# Patient Record
Sex: FEMALE | Race: WHITE
[De-identification: names, ages, dates, MRNs, and addresses within clinical notes are randomized per-mention and may not be internally consistent; named-entity substitution may affect disease eponyms.]

---

## 2017-01-04 ENCOUNTER — HOSPITAL ENCOUNTER (OUTPATIENT)
Dept: HOSPITAL 47 - RADXRMAIN | Age: 57
Discharge: HOME | End: 2017-01-04
Payer: COMMERCIAL

## 2017-01-04 DIAGNOSIS — J20.9: Primary | ICD-10-CM

## 2017-01-04 PROCEDURE — 71020: CPT

## 2017-01-04 NOTE — XR
EXAMINATION TYPE: XR chest 2V

 

DATE OF EXAM: 1/4/2017 1:55 PM

 

COMPARISON: 2/16/2016

 

HISTORY: Bronchitis and cough

 

FINDINGS:

The lungs are clear and  there is no pneumothorax, pleural effusion, or focal pneumonia. Postsurgical
 change overlying the cervical spine. Scoliosis noted. Hyperinflation suggestive of COPD. Changes sug
gestive of chronic interstitial lung disease. Degenerative change of the spine. 

 

IMPRESSION: 

1. No acute process.

## 2017-01-12 ENCOUNTER — HOSPITAL ENCOUNTER (OUTPATIENT)
Dept: HOSPITAL 47 - RADMRIMAIN | Age: 57
Discharge: HOME | End: 2017-01-12
Payer: COMMERCIAL

## 2017-01-12 DIAGNOSIS — M50.21: ICD-10-CM

## 2017-01-12 DIAGNOSIS — M47.816: ICD-10-CM

## 2017-01-12 DIAGNOSIS — M48.02: Primary | ICD-10-CM

## 2017-01-12 LAB
BUN SERPL-SCNC: 16 MG/DL (ref 7–17)
NON-AFRICAN AMERICAN GFR(MDRD): >60

## 2017-01-12 PROCEDURE — 84520 ASSAY OF UREA NITROGEN: CPT

## 2017-01-12 PROCEDURE — 82565 ASSAY OF CREATININE: CPT

## 2017-01-12 PROCEDURE — 72148 MRI LUMBAR SPINE W/O DYE: CPT

## 2017-01-12 PROCEDURE — 72156 MRI NECK SPINE W/O & W/DYE: CPT

## 2017-01-12 NOTE — MR
EXAMINATION TYPE: MR cervical spine wo/w con

 

DATE OF EXAM: 1/12/2017 12:07 PM

 

COMPARISON: NONE

 

HISTORY: Cervicalgia

 

TECHNIQUE: 

Multiplanar, multisequence images of the cervical spine were acquired utilizing 10 mL intravenous Mul
tiHance gadolinium contrast.  Diffusion weighted imaging was performed.   

 

The vertebra have normal alignment. There is anterior fusion with metal artifact at C5 C6 C7. The cer
vical spinal cord has normal signal pattern without evidence of edema. Brainstem is intact. There is 
no spinal stenosis. I see no sign of a posterior cervical significant disc herniation. There is a sma
ll posterior disc bulge at C3-4 towards the left side. The contrast images show no pathologic enhance
ment. There is disc space narrowing at C5-6 C6-7.

 

IMPRESSION:

Previous anterior fusion surgery. Degenerative disc narrowing at C5-6 C6-7. No spinal stenosis. No ev
idence of any cord impingement. There is a small left sided and central C3-4 disc bulging without imp
ingement on the spinal canal to any extent.

## 2017-01-12 NOTE — MR
EXAMINATION TYPE: MR lumbar spine wo con

 

DATE OF EXAM: 1/12/2017 11:43 AM

 

COMPARISON: MRI lumbar spine January 18, 2013. CT abdomen and pelvis May 20, 2016.

 

HISTORY: Lumbago per order. Severe low back pain with difficulty walking with pain into both lower ex
tremities as well as numbness and tingling per patient.

 

TECHNIQUE: Multiplanar, multisequence imaging of the lumbar spine is performed without IV contrast.

 

FINDINGS: Sagittal images of the lumbar spine show vertebral body heights to appear satisfactory. The
re is persistent dextroconvex scoliosis centered at L2-L3 level. There is multilevel disc desiccation
 redemonstrated. There is fairly moderate disc space narrowing with vacuum disc phenomenon most promi
nent at left L2-L3 level where there is moderate spurring and endplate changes redemonstrated. Multil
evel small posterior disc herniations are seen on sagittal images. The conus medullaris is normal in 
position and signal ending at mid L1 vertebral body level.  The bone marrow signal intensity is other
wise within normal limits. There is mild multilevel anterior spurring redemonstrated.

 

Axial images beginning at the T12-L1 level which shows mild broad disc bulge mildly effacing anterior
 thecal sac on axial image 29. Bilateral neural foramina are felt patent. No significant change from 
prior study is seen.

 

Axial images at the L1-L2 level show mild facet degenerative changes bilaterally. There is mild broad
 disc bulge mildly effacing anterior thecal sac on axial image 25, bilateral neural foramina are lancaster
nt.

 

Axial images at L2-L3 level show mild/moderate broad disc bulge with left foraminal/lateral disc prot
rusion component on axial image 20. There is effacement of the anterior thecal sac. Mild facet degene
rative changes are redemonstrated bilaterally. Right-sided neural foramina is patent. There is modera
te to severe left-sided neural foraminal narrowing with effacement of left L2 nerve redemonstrated on
 sagittal image 5. Finding appears similar to prior exam.

 

Axial images at L3-L4 level show mild to moderate facet degenerative changes and ligamentum flavum hy
pertrophy with some effacement of the posterior lateral thecal sac. There is mild broad-based posteri
or disc protrusion mildly effacing anterior thecal sac. There is mild to moderate bilateral anterior 
inferior neural foraminal narrowing at this level now identified. Some progression in degenerative fi
nding is felt present versus prior MRI at this level.

 

Axial images at L4-L5 level show mild to moderate facet degenerative changes and ligamentum flavum hy
pertrophy with some effacement the posterior lateral thecal sac on axial image 10. There is mild/mode
rate broad-based posterior disc protrusion effacing anterior thecal sac. There is moderate to severe 
right-sided neural foraminal narrowing and more mild left-sided neural foraminal narrowing identified
. There is felt interval increased in right-sided neural foraminal narrowing seen best on sagittal im
age 11 versus prior study. Some spurring is noted at this foraminal level.

 

Axial images at L5-S1 level show mild facet degenerative changes bilaterally. There is central disc p
rotrusion is seen. Spinal canal is fairly well preserved. Bilateral neural foramina are patent.

 

Common bile duct remains prominent measuring up to 11 mm diameter on axial image 21, this is grossly 
stable from prior exams. Patient is noted status post cholecystectomy but this is slightly more promi
nent than expected after cholecystectomy.

 

 

IMPRESSION: Underlying scoliosis with multilevel degenerative changes in lumbar spine as detailed abo
ve. Progression in degenerative findings as 2013 MRI is noted as detailed above.

## 2018-02-14 ENCOUNTER — HOSPITAL ENCOUNTER (INPATIENT)
Dept: HOSPITAL 47 - EC | Age: 58
LOS: 3 days | Discharge: HOME | DRG: 193 | End: 2018-02-17
Attending: HOSPITALIST | Admitting: HOSPITALIST
Payer: COMMERCIAL

## 2018-02-14 DIAGNOSIS — M79.7: ICD-10-CM

## 2018-02-14 DIAGNOSIS — J44.1: ICD-10-CM

## 2018-02-14 DIAGNOSIS — I11.9: ICD-10-CM

## 2018-02-14 DIAGNOSIS — Z79.891: ICD-10-CM

## 2018-02-14 DIAGNOSIS — Z82.49: ICD-10-CM

## 2018-02-14 DIAGNOSIS — Z79.899: ICD-10-CM

## 2018-02-14 DIAGNOSIS — J84.89: ICD-10-CM

## 2018-02-14 DIAGNOSIS — K21.9: ICD-10-CM

## 2018-02-14 DIAGNOSIS — J96.21: ICD-10-CM

## 2018-02-14 DIAGNOSIS — I25.10: ICD-10-CM

## 2018-02-14 DIAGNOSIS — E86.0: ICD-10-CM

## 2018-02-14 DIAGNOSIS — Z79.52: ICD-10-CM

## 2018-02-14 DIAGNOSIS — G89.29: ICD-10-CM

## 2018-02-14 DIAGNOSIS — I73.9: ICD-10-CM

## 2018-02-14 DIAGNOSIS — Z86.718: ICD-10-CM

## 2018-02-14 DIAGNOSIS — Z90.710: ICD-10-CM

## 2018-02-14 DIAGNOSIS — Z80.1: ICD-10-CM

## 2018-02-14 DIAGNOSIS — J44.0: ICD-10-CM

## 2018-02-14 DIAGNOSIS — Z95.1: ICD-10-CM

## 2018-02-14 DIAGNOSIS — Z79.02: ICD-10-CM

## 2018-02-14 DIAGNOSIS — M19.90: ICD-10-CM

## 2018-02-14 DIAGNOSIS — Z98.1: ICD-10-CM

## 2018-02-14 DIAGNOSIS — Z90.49: ICD-10-CM

## 2018-02-14 DIAGNOSIS — Z85.828: ICD-10-CM

## 2018-02-14 DIAGNOSIS — J10.08: Primary | ICD-10-CM

## 2018-02-14 DIAGNOSIS — F17.200: ICD-10-CM

## 2018-02-14 DIAGNOSIS — Z91.041: ICD-10-CM

## 2018-02-14 DIAGNOSIS — Z88.6: ICD-10-CM

## 2018-02-14 DIAGNOSIS — Z88.0: ICD-10-CM

## 2018-02-14 DIAGNOSIS — S22.32XA: ICD-10-CM

## 2018-02-14 DIAGNOSIS — J15.9: ICD-10-CM

## 2018-02-14 DIAGNOSIS — Z79.51: ICD-10-CM

## 2018-02-14 DIAGNOSIS — Z88.1: ICD-10-CM

## 2018-02-14 DIAGNOSIS — E78.5: ICD-10-CM

## 2018-02-14 DIAGNOSIS — J12.89: ICD-10-CM

## 2018-02-14 DIAGNOSIS — Z82.3: ICD-10-CM

## 2018-02-14 LAB
ALBUMIN SERPL-MCNC: 3.7 G/DL (ref 3.5–5)
ALP SERPL-CCNC: 89 U/L (ref 38–126)
ALT SERPL-CCNC: 18 U/L (ref 9–52)
ANION GAP SERPL CALC-SCNC: 10 MMOL/L
APTT BLD: 23.9 SEC (ref 22–30)
AST SERPL-CCNC: 18 U/L (ref 14–36)
BASOPHILS # BLD AUTO: 0 K/UL (ref 0–0.2)
BASOPHILS NFR BLD AUTO: 1 %
BUN SERPL-SCNC: 21 MG/DL (ref 7–17)
CALCIUM SPEC-MCNC: 8.8 MG/DL (ref 8.4–10.2)
CHLORIDE SERPL-SCNC: 98 MMOL/L (ref 98–107)
CK SERPL-CCNC: 67 U/L (ref 30–135)
CO2 SERPL-SCNC: 29 MMOL/L (ref 22–30)
EOSINOPHIL # BLD AUTO: 0 K/UL (ref 0–0.7)
EOSINOPHIL NFR BLD AUTO: 0 %
ERYTHROCYTE [DISTWIDTH] IN BLOOD BY AUTOMATED COUNT: 4.07 M/UL (ref 3.8–5.4)
ERYTHROCYTE [DISTWIDTH] IN BLOOD: 13.4 % (ref 11.5–15.5)
GLUCOSE SERPL-MCNC: 131 MG/DL (ref 74–99)
HCT VFR BLD AUTO: 38.4 % (ref 34–46)
HGB BLD-MCNC: 12.2 GM/DL (ref 11.4–16)
HYALINE CASTS UR QL AUTO: 1 /LPF (ref 0–2)
INR PPP: 0.9 (ref ?–1.2)
LYMPHOCYTES # SPEC AUTO: 1 K/UL (ref 1–4.8)
LYMPHOCYTES NFR SPEC AUTO: 14 %
MCH RBC QN AUTO: 29.9 PG (ref 25–35)
MCHC RBC AUTO-ENTMCNC: 31.7 G/DL (ref 31–37)
MCV RBC AUTO: 94.3 FL (ref 80–100)
MONOCYTES # BLD AUTO: 0.6 K/UL (ref 0–1)
MONOCYTES NFR BLD AUTO: 8 %
NEUTROPHILS # BLD AUTO: 5.5 K/UL (ref 1.3–7.7)
NEUTROPHILS NFR BLD AUTO: 76 %
PH UR: 6 [PH] (ref 5–8)
PLATELET # BLD AUTO: 234 K/UL (ref 150–450)
POTASSIUM SERPL-SCNC: 3.6 MMOL/L (ref 3.5–5.1)
PROT SERPL-MCNC: 6.2 G/DL (ref 6.3–8.2)
PROT UR QL: (no result)
PT BLD: 9.3 SEC (ref 9–12)
RBC UR QL: 1 /HPF (ref 0–5)
SODIUM SERPL-SCNC: 137 MMOL/L (ref 137–145)
SP GR UR: 1.01 (ref 1–1.03)
SQUAMOUS UR QL AUTO: 1 /HPF (ref 0–4)
TROPONIN I SERPL-MCNC: <0.012 NG/ML (ref 0–0.03)
UROBILINOGEN UR QL STRIP: <2 MG/DL (ref ?–2)
WBC # BLD AUTO: 7.3 K/UL (ref 3.8–10.6)
WBC #/AREA URNS HPF: 2 /HPF (ref 0–5)

## 2018-02-14 PROCEDURE — 87086 URINE CULTURE/COLONY COUNT: CPT

## 2018-02-14 PROCEDURE — 70450 CT HEAD/BRAIN W/O DYE: CPT

## 2018-02-14 PROCEDURE — 81001 URINALYSIS AUTO W/SCOPE: CPT

## 2018-02-14 PROCEDURE — 87502 INFLUENZA DNA AMP PROBE: CPT

## 2018-02-14 PROCEDURE — 93005 ELECTROCARDIOGRAM TRACING: CPT

## 2018-02-14 PROCEDURE — 95819 EEG AWAKE AND ASLEEP: CPT

## 2018-02-14 PROCEDURE — 71046 X-RAY EXAM CHEST 2 VIEWS: CPT

## 2018-02-14 PROCEDURE — 80053 COMPREHEN METABOLIC PANEL: CPT

## 2018-02-14 PROCEDURE — 82553 CREATINE MB FRACTION: CPT

## 2018-02-14 PROCEDURE — 94640 AIRWAY INHALATION TREATMENT: CPT

## 2018-02-14 PROCEDURE — 36415 COLL VENOUS BLD VENIPUNCTURE: CPT

## 2018-02-14 PROCEDURE — 83605 ASSAY OF LACTIC ACID: CPT

## 2018-02-14 PROCEDURE — 84484 ASSAY OF TROPONIN QUANT: CPT

## 2018-02-14 PROCEDURE — 85610 PROTHROMBIN TIME: CPT

## 2018-02-14 PROCEDURE — 96361 HYDRATE IV INFUSION ADD-ON: CPT

## 2018-02-14 PROCEDURE — 96365 THER/PROPH/DIAG IV INF INIT: CPT

## 2018-02-14 PROCEDURE — 82550 ASSAY OF CK (CPK): CPT

## 2018-02-14 PROCEDURE — 80048 BASIC METABOLIC PNL TOTAL CA: CPT

## 2018-02-14 PROCEDURE — 99285 EMERGENCY DEPT VISIT HI MDM: CPT

## 2018-02-14 PROCEDURE — 87040 BLOOD CULTURE FOR BACTERIA: CPT

## 2018-02-14 PROCEDURE — 85730 THROMBOPLASTIN TIME PARTIAL: CPT

## 2018-02-14 PROCEDURE — 72072 X-RAY EXAM THORAC SPINE 3VWS: CPT

## 2018-02-14 PROCEDURE — 85025 COMPLETE CBC W/AUTO DIFF WBC: CPT

## 2018-02-14 RX ADMIN — METHYLPREDNISOLONE SODIUM SUCCINATE SCH MG: 125 INJECTION, POWDER, FOR SOLUTION INTRAMUSCULAR; INTRAVENOUS at 21:21

## 2018-02-14 RX ADMIN — CARVEDILOL SCH MG: 12.5 TABLET, FILM COATED ORAL at 21:21

## 2018-02-14 RX ADMIN — BUDESONIDE SCH MG: 1 SUSPENSION RESPIRATORY (INHALATION) at 21:29

## 2018-02-14 RX ADMIN — NICARDIPINE HYDROCHLORIDE SCH MLS/HR: 2.5 INJECTION INTRAVENOUS at 14:00

## 2018-02-14 RX ADMIN — PANTOPRAZOLE SODIUM SCH MG: 40 TABLET, DELAYED RELEASE ORAL at 21:22

## 2018-02-14 RX ADMIN — IPRATROPIUM BROMIDE SCH: 0.5 SOLUTION RESPIRATORY (INHALATION) at 21:30

## 2018-02-14 RX ADMIN — NICARDIPINE HYDROCHLORIDE SCH MLS/HR: 2.5 INJECTION INTRAVENOUS at 14:35

## 2018-02-14 RX ADMIN — OXYCODONE AND ACETAMINOPHEN PRN EACH: 5; 325 TABLET ORAL at 21:38

## 2018-02-14 NOTE — ED
Syncope HPI





- General


Chief Complaint: Syncope


Stated Complaint: Altered Mental Status


Time Seen by Provider: 18 13:26


Source: patient


Mode of arrival: wheelchair


Limitations: no limitations





- History of Present Illness


Initial Comments: 


58 years old female was found on the kitchen floor by her  445 this 

morning she was quite altered mental status she was confused she Repeating 

things and family wanted to bring her to the hospital and she refused to come 

to the hospital at this state continued for about 5-6 hours when finally she 

agreed to come to the hospital she is complaining about shortness of breath and 

then she spiked a fever and her fever was 102.3 in the ER, complaining about 

chest pain complaining about shortness of breath and she has been coughing.  

Positive changes in mental status positive chest pain past a shortness of 

breath no abdominal pain no frequency urgency dysuria.  She has a multiple 

comorbidities at a peripheral vascular disease with multiple interventions








- Related Data


 Home Medications











 Medication  Instructions  Recorded  Confirmed


 


Carvedilol 25 mg PO BID 12/11/15 02/14/18


 


Cetirizine HCl [Zyrtec] 10 mg PO DAILY 12/11/15 02/14/18


 


Hydrochlorothiazide 25 mg PO DAILY 12/11/15 02/14/18


 


Losartan Potassium 100 mg PO DAILY 12/11/15 02/14/18


 


Morphine Sulfate [Morphine Sulfate 30 mg PO DAILY PRN 12/11/15 02/14/18





ER]   


 


Omeprazole [PriLOSEC] 20 mg PO BID 12/11/15 02/14/18


 


Pravastatin Sodium [Pravachol] 20 mg PO DAILY 12/11/15 02/14/18


 


Ipratropium Bromide [Atrovent Hfa] 2 puff INHALATION RT-QID 16


 


Azithromycin [Zithromax Z-pack] See Taper PO AS DIRECTED 18


 


Baclofen [Lioresal] 10 mg PO TID PRN 18


 


Depo-Medrol Injection 80 mg SQ ONCE 18


 


Fluticasone Propionate [Flovent 1 puff INHALATION RT-BID 18





Hfa 110mcg]   


 


Ipratropium-Albuterol Nebulize 3 ml INHALATION RT-QID PRN 18





[Duoneb 0.5 mg-3 mg/3 ml Soln]   


 


oxyCODONE-APAP 5-325MG [Percocet 1 tab PO DAILY PRN 18





5-325 mg]   


 


predniSONE 20 mg PO DAILY 18








 Previous Rx's











 Medication  Instructions  Recorded


 


Clopidogrel [Plavix] 75 mg PO DAILY #30 tab 12/17/15











 Allergies











Allergy/AdvReac Type Severity Reaction Status Date / Time


 


Iodinated Contrast- Oral and Allergy Mild cold Verified 18 13:38





IV Dye   symptoms,  





[Iodinated Contrast Media -   runny  





IV Dye]   nose,  





   itchy eyes  





   cough  


 


aspirin Allergy  Anaphylaxis Verified 18 13:38


 


cephalexin monohydrate AdvReac  Anaphylaxis Verified 18 13:38





[From Keflex]     


 


NSAIDS (Non-Steroidal AdvReac  Anaphylaxis Verified 18 13:38





Anti-Inflamma     


 


Penicillins AdvReac  Anaphylaxis Verified 18 13:38


 


bandaid AdvReac Mild skin peels Uncoded 18 13:22





   off  














Review of Systems


ROS Statement: 


Those systems with pertinent positive or pertinent negative responses have been 

documented in the HPI.





ROS Other: All systems not noted in ROS Statement are negative.





Past Medical History


Past Medical History: Asthma, Coronary Artery Disease (CAD), Cancer, COPD, Deep 

Vein Thrombosis (DVT), Fibromyalgia, GERD/Reflux, Hyperlipidemia, Hypertension, 

Osteoarthritis (OA), Respiratory Disorder


Additional Past Medical History / Comment(s): chronic bronchitis, nodules on 

thyroid, lip and skin cancer. Pt currently has gangrene on right foot


History of Any Multi-Drug Resistant Organisms: None Reported


Past Surgical History: Appendectomy,  Section, Cholecystectomy, 

Coronary Bypass/CABG, Hysterectomy, Joint Replacement, Orthopedic Surgery


Additional Past Surgical History / Comment(s): cervical fusion,aortogram w/ 

runoff rt arm, 12-16-15 ILIAC STENTING.


Past Anesthesia/Blood Transfusion Reactions: Previous Problems w/ Anesthesia, 

Postoperative Nausea & Vomiting (PONV)


Additional Past Anesthesia/Blood Transfusion Reaction / Comment(s): difficulty 

come out of anesthesia


Past Psychological History: Depression


Smoking Status: Current every day smoker


Past Alcohol Use History: None Reported


Past Drug Use History: None Reported





- Past Family History


  ** Mother


Family Medical History: Cancer, Coronary Artery Disease (CAD), Renal Disease


Additional Family Medical History / Comment(s): CABG, LUNG CANCER,





  ** Brother(s)


Family Medical History: Coronary Artery Disease (CAD), CVA/TIA


Additional Family Medical History / Comment(s): 1 BROTHER CABG, 1 BROTHER STROKE





  ** Father


Family Medical History: Coronary Artery Disease (CAD), Renal Disease


Additional Family Medical History / Comment(s): X4 OPEN HEART SX. HUGE CARDIAC 

HX ON DADS SIDE OF FAMILY





General Exam





- General Exam Comments


Initial Comments: 


General:  The patient is awake and alert, she looks pale and weak 


Skin:  Skin is warm and dry and no rashes or lesions are noted. 


Eye:  Pupils are equal, round and reactive to light, extra-ocular movements are 

intact; there is normal conjunctiva bilaterally.  


Ears, nose, mouth and throat:  Membranes are dry, she looks dehydrated


Neck:  The neck is supple, there is no tenderness  or JVD.  


Cardiovascular:  There is a regular rate and rhythm. No murmur, rub or gallop 

is appreciated.


Respiratory: To auscultation bilateral, severe COPD


Gastrointestinal:  Soft, non-distended, non-tender abdomen without masses or 

organomegaly noted. There is no rebound or guarding present. Bowel sounds are 

unremarkable. 


Back:  There is no tenderness to palpation in the midline. There is no obvious 

deformity.


Musculoskeletal:  Normal ROM, no tenderness, There is no pedal edema. There is 

no calf tenderness or swelling. No cords were appreciated.  


Neurological:  CN II-XII intact, Cranial nerves III through XII are intact. 

There are no obvious motor or sensory deficits. Coordination appears grossly 

intact. Speech is normal.


Psychiatric:  Cooperative, appropriate mood & affect, normal judgment.  








Limitations: no limitations





Course


 Vital Signs











  18





  13:17 13:55 14:12


 


Temperature 98.6 F 102.8 F H 


 


Pulse Rate 104 H  95


 


Respiratory 20  18





Rate   


 


Blood Pressure 120/59  


 


O2 Sat by Pulse 87 L  





Oximetry   














  18





  14:28


 


Temperature 


 


Pulse Rate 99


 


Respiratory 





Rate 


 


Blood Pressure 


 


O2 Sat by Pulse 





Oximetry 








ALLERGIES reassessed at 1515, CBC is unremarkable INR is within normal range 

compressive metabolic panel is unremarkable flu a is positive him and she 

earlier had a fever of 102 with the septic workup week get her started on some 

broad-spectrum antibiotics blood cultures urine cultures were ordered along 

with chest x-ray and chest x-ray shows an interstitial pneumonitis and COPD, 

head CT is pending still.  Considering M she had altered mental status for 

several hours here in the CT is normal I would like to put in the hospital 

continue the Tamiflu as well as antibiotic to the cultures come back wound will 

keep her under Dr. Roe's service





EKG Findings





- EKG Comments:


EKG Findings:: Normal sinus rhythm ventricular rate is 98 TN interval is 126 

QRS duration is 82 QT/QTC 3: review of this EKG does not reveal any ST 

elevation or ST depression





Medical Decision Making





- Lab Data


Result diagrams: 


 18 14:00





 18 14:00


 Lab Results











  18 Range/Units





  14:00 14:00 14:00 


 


WBC   7.3   (3.8-10.6)  k/uL


 


RBC   4.07   (3.80-5.40)  m/uL


 


Hgb   12.2   (11.4-16.0)  gm/dL


 


Hct   38.4   (34.0-46.0)  %


 


MCV   94.3   (80.0-100.0)  fL


 


MCH   29.9   (25.0-35.0)  pg


 


MCHC   31.7   (31.0-37.0)  g/dL


 


RDW   13.4   (11.5-15.5)  %


 


Plt Count   234   (150-450)  k/uL


 


Neutrophils %   76   %


 


Lymphocytes %   14   %


 


Monocytes %   8   %


 


Eosinophils %   0   %


 


Basophils %   1   %


 


Neutrophils #   5.5   (1.3-7.7)  k/uL


 


Lymphocytes #   1.0   (1.0-4.8)  k/uL


 


Monocytes #   0.6   (0-1.0)  k/uL


 


Eosinophils #   0.0   (0-0.7)  k/uL


 


Basophils #   0.0   (0-0.2)  k/uL


 


PT     (9.0-12.0)  sec


 


INR     (<1.2)  


 


APTT     (22.0-30.0)  sec


 


Sodium    137  (137-145)  mmol/L


 


Potassium    3.6  (3.5-5.1)  mmol/L


 


Chloride    98  ()  mmol/L


 


Carbon Dioxide    29  (22-30)  mmol/L


 


Anion Gap    10  mmol/L


 


BUN    21 H  (7-17)  mg/dL


 


Creatinine    0.70  (0.52-1.04)  mg/dL


 


Est GFR (MDRD) Af Amer    >60  (>60 ml/min/1.73 sqM)  


 


Est GFR (MDRD) Non-Af    >60  (>60 ml/min/1.73 sqM)  


 


Glucose    131 H  (74-99)  mg/dL


 


Plasma Lactic Acid Waldemar     (0.7-2.0)  mmol/L


 


Calcium    8.8  (8.4-10.2)  mg/dL


 


Total Bilirubin    0.2  (0.2-1.3)  mg/dL


 


AST    18  (14-36)  U/L


 


ALT    18  (9-52)  U/L


 


Alkaline Phosphatase    89  ()  U/L


 


Total Creatine Kinase  67    ()  U/L


 


CK-MB (CK-2)  1.2    (0.0-2.4)  ng/mL


 


CK-MB (CK-2) Rel Index  1.8    


 


Troponin I  <0.012    (0.000-0.034)  ng/mL


 


Total Protein    6.2 L  (6.3-8.2)  g/dL


 


Albumin    3.7  (3.5-5.0)  g/dL


 


Influenza Type A RNA     (Not Detectd)  


 


Influenza Type B (PCR)     (Not Detectd)  














  18 Range/Units





  14:00 14:00 14:10 


 


WBC     (3.8-10.6)  k/uL


 


RBC     (3.80-5.40)  m/uL


 


Hgb     (11.4-16.0)  gm/dL


 


Hct     (34.0-46.0)  %


 


MCV     (80.0-100.0)  fL


 


MCH     (25.0-35.0)  pg


 


MCHC     (31.0-37.0)  g/dL


 


RDW     (11.5-15.5)  %


 


Plt Count     (150-450)  k/uL


 


Neutrophils %     %


 


Lymphocytes %     %


 


Monocytes %     %


 


Eosinophils %     %


 


Basophils %     %


 


Neutrophils #     (1.3-7.7)  k/uL


 


Lymphocytes #     (1.0-4.8)  k/uL


 


Monocytes #     (0-1.0)  k/uL


 


Eosinophils #     (0-0.7)  k/uL


 


Basophils #     (0-0.2)  k/uL


 


PT  9.3    (9.0-12.0)  sec


 


INR  0.9    (<1.2)  


 


APTT  23.9    (22.0-30.0)  sec


 


Sodium     (137-145)  mmol/L


 


Potassium     (3.5-5.1)  mmol/L


 


Chloride     ()  mmol/L


 


Carbon Dioxide     (22-30)  mmol/L


 


Anion Gap     mmol/L


 


BUN     (7-17)  mg/dL


 


Creatinine     (0.52-1.04)  mg/dL


 


Est GFR (MDRD) Af Amer     (>60 ml/min/1.73 sqM)  


 


Est GFR (MDRD) Non-Af     (>60 ml/min/1.73 sqM)  


 


Glucose     (74-99)  mg/dL


 


Plasma Lactic Acid Waldemar   1.1   (0.7-2.0)  mmol/L


 


Calcium     (8.4-10.2)  mg/dL


 


Total Bilirubin     (0.2-1.3)  mg/dL


 


AST     (14-36)  U/L


 


ALT     (9-52)  U/L


 


Alkaline Phosphatase     ()  U/L


 


Total Creatine Kinase     ()  U/L


 


CK-MB (CK-2)     (0.0-2.4)  ng/mL


 


CK-MB (CK-2) Rel Index     


 


Troponin I     (0.000-0.034)  ng/mL


 


Total Protein     (6.3-8.2)  g/dL


 


Albumin     (3.5-5.0)  g/dL


 


Influenza Type A RNA    Detected H  (Not Detectd)  


 


Influenza Type B (PCR)    Not Detected  (Not Detectd)  














Disposition


Clinical Impression: 


 Syncope, Altered mental status, Sepsis, Influenza A





Disposition: ADMITTED AS IP TO THIS HOSP


Condition: Fair


Referrals: 


Parmjit Roe MD [Primary Care Provider] - 1-2 days

## 2018-02-14 NOTE — CT
EXAMINATION TYPE: CT brain wo con

 

DATE OF EXAM: 2/14/2018

 

COMPARISON: 4/17/2014

 

HISTORY: 58-year-old female complains of syncope.

 

TECHNIQUE:  Examination was done in axial plane without intravenous contrast.  Coronal and sagittal r
econstructions performed.

 

CT DLP: 816.3 mGycm

Automated exposure control for dose reduction was used.

 

FINDINGS:

There is no evidence of  acute intracranial hemorrhage, acute ischemic changes, mass, mass-effect, or
 extra-axial fluid collection.  There is no effacement of cerebral sulci or basal subarachnoid cister
ns.  There is no hydrocephalus.  There is no midline shift.  Gray-white matter distinction is preserv
ed.

 

Paranasal sinuses and mastoid air cells are well pneumatized. Orbits and globes are intact.

 

 

IMPRESSION:

No acute intracranial abnormality seen.

## 2018-02-14 NOTE — XR
EXAMINATION TYPE: XR chest 2V

 

DATE OF EXAM: 2/14/2018

 

COMPARISON: 1/4/2017

 

TECHNIQUE: PA and lateral views submitted.

 

HISTORY: Syncope

 

FINDINGS:

The lungs are clear and  there is no pneumothorax, pleural effusion, or focal pneumonia. There is hyp
erinflation suggests COPD and there is degenerative change of the spine. Heart size stable. Surgical 
change overlying the cervical spine. Somewhat coarsened central interstitium.

 

Arthropathy of the shoulders.

 

IMPRESSION: 

1. Interstitium is somewhat increased relative to the prior exam correlate for mild venous congestion
 or interstitial pneumonitis superimposed on a background of COPD.

## 2018-02-14 NOTE — P.HPIM
History of Present Illness


H&P Date: 18


Chief Complaint: Altered mental status





Patient is a 58-year-old female with a known history of COPD not on home oxygen

, fibromyalgia, chronic pain, and severe peripheral vascular disease with 

history of I aortic and femoral popliteal surgeries was found on the kitchen 

floor by her  445 this morning she was quite altered mental status she 

was confused she Repeating things and family wanted to bring her to the 

hospital and she refused to come to the hospital at this state continued for 

about 5-6 hours when finally she agreed to come to the hospital.  Patient has 

been cough and cold for the past 2-3 days. she is complaining about shortness 

of breath and then she spiked a fever and her fever was 102.3 in the ER, 

complaining about chest pain complaining about shortness of breath and she has 

been coughing. no abdominal pain no frequency urgency dysuria.


Patient says that she might have sleep apnea on the floor and has been having 

some left ankle pain and otherwise denied any head injury or any other pain.  

Denied any history of seizures.  No history of CVA in the past.  No bladder or 

bowel incontinence.


Chest x-ray showed COPD and possible interstitial pneumonitis


CT head showed no acute intracranial process





Review of Systems





Constitutional: Patient does have fever and chills and generalized weakness .  

No weight loss.  


Abdomen: Patient denied nausea vomiting and diarrhea and abdominal pain.


Cardiovascular: Patient denies any chest pain no palpitations.  Does have 

shortness of breath.  No leg swelling


Respiratory: Cough without sputum production shortness of breath


Neurologic: Patient denied any numbness or tingling headache.


Musculoskeletal: Patient denies any complaints of joint swelling or deformity.  

Left ankle pain


Skin: Negative


Psychiatric: Anxious


Endocrine: No heat or cold intolerance.  No recent weight gain.


Genitourinary: No dysuria or hematuria.


All other 14 point ROS negative except the above





Past Medical History


Past Medical History: Asthma, Coronary Artery Disease (CAD), Cancer, COPD, Deep 

Vein Thrombosis (DVT), Fibromyalgia, GERD/Reflux, Hyperlipidemia, Hypertension, 

Osteoarthritis (OA), Respiratory Disorder, Vascular Disorder


Additional Past Medical History / Comment(s): pad,chronic bronchitis, nodules 

on thyroid, leukoplakia-lip and vocal cords. past" gangrene on right foot"


History of Any Multi-Drug Resistant Organisms: None Reported


Past Surgical History: Appendectomy,  Section, Cholecystectomy, 

Hysterectomy, Joint Replacement, Orthopedic Surgery


Additional Past Surgical History / Comment(s): leukoplakia removed from lip and 

vocal cords.cervical fusion, rt aorto iliac bypass,aortogram w/ runoff.  2015 had stenting of aorto to rt ext iliac bypass, rt foot 2nd toe 

reconstructive sx, rt carotid endarterectomy, egd/colonoscopy ,cervical fusion,

lt knee replacment.lt elbow sx,umbilical hernia repair,bilcarpal tunnel release,

lt breast bx-neg


Past Anesthesia/Blood Transfusion Reactions: Previous Problems w/ Anesthesia, 

Postoperative Nausea & Vomiting (PONV)


Additional Past Anesthesia/Blood Transfusion Reaction / Comment(s): difficulty 

waking from anesthesia


Smoking Status: Current every day smoker





- Past Family History


  ** Mother


Family Medical History: Cancer, Coronary Artery Disease (CAD), Renal Disease


Additional Family Medical History / Comment(s): CABG, LUNG CANCER,





  ** Brother(s)


Family Medical History: Coronary Artery Disease (CAD), CVA/TIA


Additional Family Medical History / Comment(s): 1 BROTHER CABG, 1 BROTHER STROKE





  ** Father


Family Medical History: Coronary Artery Disease (CAD), Renal Disease


Additional Family Medical History / Comment(s): X4 OPEN HEART SX. HUGE CARDIAC 

HX ON DADS SIDE OF FAMILY





Medications and Allergies


 Home Medications











 Medication  Instructions  Recorded  Confirmed  Type


 


Carvedilol 25 mg PO BID 12/11/15 02/14/18 History


 


Cetirizine HCl [Zyrtec] 10 mg PO DAILY 12/11/15 02/14/18 History


 


Hydrochlorothiazide 25 mg PO DAILY 12/11/15 02/14/18 History


 


Losartan Potassium 100 mg PO DAILY 12/11/15 02/14/18 History


 


Morphine Sulfate [Morphine Sulfate 30 mg PO DAILY PRN 12/11/15 02/14/18 History





ER]    


 


Omeprazole [PriLOSEC] 20 mg PO BID 12/11/15 02/14/18 History


 


Pravastatin Sodium [Pravachol] 20 mg PO DAILY 12/11/15 02/14/18 History


 


Clopidogrel [Plavix] 75 mg PO DAILY #30 tab 12/17/15 02/14/18 Rx


 


Ipratropium Bromide [Atrovent Hfa] 2 puff INHALATION RT-QID 16 

History


 


Azithromycin [Zithromax Z-pack] See Taper PO AS DIRECTED 18 

History


 


Baclofen [Lioresal] 10 mg PO TID PRN 18 History


 


Depo-Medrol Injection 80 mg SQ ONCE 18 History


 


Fluticasone Propionate [Flovent 1 puff INHALATION RT-BID 18 

History





Hfa 110mcg]    


 


Ipratropium-Albuterol Nebulize 3 ml INHALATION RT-QID PRN 18 

History





[Duoneb 0.5 mg-3 mg/3 ml Soln]    


 


oxyCODONE-APAP 5-325MG [Percocet 1 tab PO DAILY PRN 18 History





5-325 mg]    


 


predniSONE 20 mg PO DAILY 18 History











 Allergies











Allergy/AdvReac Type Severity Reaction Status Date / Time


 


Iodinated Contrast- Oral and Allergy Mild cold Verified 18 13:38





IV Dye   symptoms,  





[Iodinated Contrast Media -   runny  





IV Dye]   nose,  





   itchy eyes  





   cough  


 


aspirin Allergy  Anaphylaxis Verified 18 13:38


 


cephalexin monohydrate AdvReac  Anaphylaxis Verified 18 13:38





[From Keflex]     


 


NSAIDS (Non-Steroidal AdvReac  Anaphylaxis Verified 18 13:38





Anti-Inflamma     


 


Penicillins AdvReac  Anaphylaxis Verified 18 13:38


 


bandaid AdvReac Mild skin peels Uncoded 18 13:22





   off  














Physical Exam


Vitals: 


 Vital Signs











  Temp Pulse Pulse Resp BP BP Pulse Ox


 


 18 18:55  100.4 F H   98  18   133/62  94 L


 


 18 18:00  101.8 F H  96   20  128/61   94 L


 


 18 16:00   110 H   20  126/92   95


 


 18 15:22  101.8 F H  101 H   20  131/88   95


 


 18 14:28   99     


 


 18 14:12   95   18   


 


 18 13:55  102.8 F H      


 


 18 13:17  98.6 F  104 H   20  120/59   87 L








 Intake and Output











 18





 06:59 14:59 22:59


 


Other:   


 


  Weight  49.895 kg 








 Patient Weight











 02/15/18





 06:59


 


Weight 49.895 kg














PHYSICAL EXAMINATION: 


Patient is lying in the bed comfortably, mild distress, awake alert and 

oriented.  Seems anxious. 


HEENT: Normocephalic. Neck is supple. Pupils reactive. Nostrils clear. Oral 

cavity is moist. Ears reveal no drainage. 


Neck reveals no JVD, carotid bruits, or thyromegaly. 


CHEST EXAMINATION: Trachea is central. Symmetrical expansion. Lung fields clear 

to auscultation and percussion. 


CARDIAC: Normal S1, S2 with no gallops. No murmurs 


ABDOMEN: Soft. Bowel sounds normal. No organomegaly. No abdominal bruits. 


Extremities: reveal no edema.  No clubbing or cyanosis


Neurologically awake, alert, oriented x3 with well-coordinated movements.  No 

focal deficits noted


Skin: No rash or skin lesions. 


Psychiatric: Cooperative.  Nonsuicidal


Musculoskeletal: No joint swelling or deformity.  Normal range of motion.








Results


CBC & Chem 7: 


 18 14:00





 18 14:00


Labs: 


 Abnormal Lab Results - Last 24 Hours (Table)











  18 Range/Units





  14:00 14:10 


 


BUN  21 H   (7-17)  mg/dL


 


Glucose  131 H   (74-99)  mg/dL


 


Total Protein  6.2 L   (6.3-8.2)  g/dL


 


Influenza Type A RNA   Detected H  (Not Detectd)  














Thrombosis Risk Factor Assmnt





- DVT/VTE Prophylaxis


DVT/VTE Prophylaxis: Pharmacologic Prophylaxis ordered





Assessment and Plan


Assessment: 





Altered mental status and found on floor.  Resolved now.  Unlikely 

encephalopathy.


Syncope likely due to dehydration and volume depletion and infection.  Ruled 

out acute CVA. CT head negative


Acute influenza A infection


History of severe peripheral vascular disease and multiple aortic and femoral 

surgeries


COPD with exacerbation


Fibromyalgia and chronic pain


Hyperlipidemia


Hypertension


Osteoarthritis and history of cervical fusion


History of right foot gangrene


History of right carotid endarterectomy


DVT prophylaxis





Plan:


Patient be continued on IV fluids.  Continue the Tamiflu.  Currently patient is 

awake and oriented.  CT head negative for any acute CVA.  Continue the home 

medications.  Patient also will be continued on breathing treatments and IV 

steroids and empiric antibiotics.  Follow up closely.  Neurology was consulted 

for evaluation of syncope.  Further recommendations based on the clinical 

course.


Time with Patient: Greater than 30

## 2018-02-15 LAB
ANION GAP SERPL CALC-SCNC: 10 MMOL/L
BASOPHILS # BLD AUTO: 0 K/UL (ref 0–0.2)
BASOPHILS NFR BLD AUTO: 0 %
BUN SERPL-SCNC: 14 MG/DL (ref 7–17)
CALCIUM SPEC-MCNC: 8.8 MG/DL (ref 8.4–10.2)
CHLORIDE SERPL-SCNC: 104 MMOL/L (ref 98–107)
CO2 SERPL-SCNC: 26 MMOL/L (ref 22–30)
EOSINOPHIL # BLD AUTO: 0 K/UL (ref 0–0.7)
EOSINOPHIL NFR BLD AUTO: 0 %
ERYTHROCYTE [DISTWIDTH] IN BLOOD BY AUTOMATED COUNT: 3.92 M/UL (ref 3.8–5.4)
ERYTHROCYTE [DISTWIDTH] IN BLOOD: 13.4 % (ref 11.5–15.5)
GLUCOSE SERPL-MCNC: 159 MG/DL (ref 74–99)
HCT VFR BLD AUTO: 36.3 % (ref 34–46)
HGB BLD-MCNC: 11.9 GM/DL (ref 11.4–16)
LYMPHOCYTES # SPEC AUTO: 0.6 K/UL (ref 1–4.8)
LYMPHOCYTES NFR SPEC AUTO: 14 %
MCH RBC QN AUTO: 30.3 PG (ref 25–35)
MCHC RBC AUTO-ENTMCNC: 32.6 G/DL (ref 31–37)
MCV RBC AUTO: 92.7 FL (ref 80–100)
MONOCYTES # BLD AUTO: 0.1 K/UL (ref 0–1)
MONOCYTES NFR BLD AUTO: 3 %
NEUTROPHILS # BLD AUTO: 3.1 K/UL (ref 1.3–7.7)
NEUTROPHILS NFR BLD AUTO: 81 %
PLATELET # BLD AUTO: 242 K/UL (ref 150–450)
POTASSIUM SERPL-SCNC: 4.4 MMOL/L (ref 3.5–5.1)
SODIUM SERPL-SCNC: 140 MMOL/L (ref 137–145)
WBC # BLD AUTO: 3.9 K/UL (ref 3.8–10.6)

## 2018-02-15 RX ADMIN — METHYLPREDNISOLONE SODIUM SUCCINATE SCH MG: 125 INJECTION, POWDER, FOR SOLUTION INTRAMUSCULAR; INTRAVENOUS at 06:36

## 2018-02-15 RX ADMIN — OXYCODONE AND ACETAMINOPHEN PRN EACH: 5; 325 TABLET ORAL at 16:56

## 2018-02-15 RX ADMIN — OSELTAMIVIR PHOSPHATE SCH MG: 75 CAPSULE ORAL at 21:19

## 2018-02-15 RX ADMIN — METHYLPREDNISOLONE SODIUM SUCCINATE SCH MG: 125 INJECTION, POWDER, FOR SOLUTION INTRAMUSCULAR; INTRAVENOUS at 18:42

## 2018-02-15 RX ADMIN — METHYLPREDNISOLONE SODIUM SUCCINATE SCH MG: 125 INJECTION, POWDER, FOR SOLUTION INTRAMUSCULAR; INTRAVENOUS at 01:24

## 2018-02-15 RX ADMIN — IPRATROPIUM BROMIDE SCH MG: 0.5 SOLUTION RESPIRATORY (INHALATION) at 08:38

## 2018-02-15 RX ADMIN — OSELTAMIVIR PHOSPHATE SCH MG: 75 CAPSULE ORAL at 07:49

## 2018-02-15 RX ADMIN — IPRATROPIUM BROMIDE SCH MG: 0.5 SOLUTION RESPIRATORY (INHALATION) at 16:17

## 2018-02-15 RX ADMIN — CLOPIDOGREL BISULFATE SCH MG: 75 TABLET ORAL at 08:59

## 2018-02-15 RX ADMIN — LORATADINE SCH MG: 10 TABLET ORAL at 08:59

## 2018-02-15 RX ADMIN — LOSARTAN POTASSIUM SCH MG: 50 TABLET, FILM COATED ORAL at 08:59

## 2018-02-15 RX ADMIN — NICOTINE SCH PATCH: 21 PATCH, EXTENDED RELEASE TRANSDERMAL at 09:00

## 2018-02-15 RX ADMIN — ENOXAPARIN SODIUM SCH MG: 40 INJECTION SUBCUTANEOUS at 08:59

## 2018-02-15 RX ADMIN — BUDESONIDE SCH MG: 1 SUSPENSION RESPIRATORY (INHALATION) at 20:26

## 2018-02-15 RX ADMIN — PRAVASTATIN SODIUM SCH MG: 20 TABLET ORAL at 09:00

## 2018-02-15 RX ADMIN — PANTOPRAZOLE SODIUM SCH MG: 40 TABLET, DELAYED RELEASE ORAL at 21:20

## 2018-02-15 RX ADMIN — METHYLPREDNISOLONE SODIUM SUCCINATE SCH MG: 125 INJECTION, POWDER, FOR SOLUTION INTRAMUSCULAR; INTRAVENOUS at 14:03

## 2018-02-15 RX ADMIN — MORPHINE SULFATE PRN MG: 30 TABLET, FILM COATED, EXTENDED RELEASE ORAL at 07:47

## 2018-02-15 RX ADMIN — LEVOFLOXACIN SCH MG: 500 TABLET, FILM COATED ORAL at 14:03

## 2018-02-15 RX ADMIN — PANTOPRAZOLE SODIUM SCH MG: 40 TABLET, DELAYED RELEASE ORAL at 07:48

## 2018-02-15 RX ADMIN — CARVEDILOL SCH MG: 12.5 TABLET, FILM COATED ORAL at 07:47

## 2018-02-15 RX ADMIN — CARVEDILOL SCH MG: 12.5 TABLET, FILM COATED ORAL at 16:56

## 2018-02-15 RX ADMIN — IPRATROPIUM BROMIDE SCH: 0.5 SOLUTION RESPIRATORY (INHALATION) at 12:26

## 2018-02-15 RX ADMIN — BUDESONIDE SCH MG: 1 SUSPENSION RESPIRATORY (INHALATION) at 08:38

## 2018-02-15 RX ADMIN — IPRATROPIUM BROMIDE SCH MG: 0.5 SOLUTION RESPIRATORY (INHALATION) at 20:26

## 2018-02-15 RX ADMIN — HYDROCHLOROTHIAZIDE SCH MG: 25 TABLET ORAL at 08:59

## 2018-02-15 NOTE — XR
Chest x-ray with left RIBS

 

HISTORY: Back pain, fall

 

Frontal view of the chest, 4 views of the left ribs submitted and correlated to prior chest x-ray 2/1
4/2018

 

Bone mineralization is reduced which may limit sensitivity. No definite displaced rib fracture is hamilton
dent, however, questionable nondisplaced fracture at the posterior sixth rib on the left seen on the 
oblique view. Chest shows interstitial changes. Postop changes noted to the cervical spine. The aorta
 is dense. Prominent lung volumes suggest underlying COPD. There is an underlying scoliosis. Postop c
hanges are noted to the abdomen, iliac stent placement has been performed. Heart size is normal. Pulm
onary artery appears prominently possibly due to pulmonary artery hypertension.

 

IMPRESSION: Possible posterior left sixth rib fracture, bone scan could be performed for additional e
valuation. Interstitial lung disease, possible underlying emphysema, pulmonary artery hypertension, a
dditional findings above.

## 2018-02-15 NOTE — XR
Thoracic spine

 

HISTORY: Trauma and pain

 

3 views of the thoracic spine

 

Correlation to chest x-ray and left rib same date

 

Postop change noted to the cervical spine. Bone mineralization is reduced which could limit sensitivi
ty. There is an S-shaped thoracic lumbar scoliosis with associated spondylosis. Disc spaces are maint
ained. Surgical clips present in the right upper quadrant. Thoracic vertebral body height is maintain
ed. Mild anterolisthesis grade 1 C3-4 incidentally noted may be due to facet arthropathy.

 

IMPRESSION: No acute fracture or subluxation. Scoliosis and osteopenia, bone scan or MRI may be of be
nefit for better evaluation. Additional findings above.

## 2018-02-15 NOTE — P.PN
Subjective


Progress Note Date: 02/15/18


Principal diagnosis: 





Syncopal episode





This is a 58-year-old  female known to our practice being evaluated 

for an episode of syncope.  She was brought to [facility] emergency 

room after she was found on the kitchen floor by her .  She remembers 

feeling fatigued and lightheaded that day.  She has a history of significant 

COPD.  She also reports having some upper respiratory symptoms.  She has no 

history of seizures.  No seizure-like activity was witnessed.  At presentation 

in the emergency room she did have a temperature of 102.8.  Her oxygen 

saturation was 87%.  Her computed tomography scan of the brain showed no acute 

intracranial abnormalities.  Chest x-ray showed possible pneumonitis.  She did 

test positive for influenza A.  She has been started on Tamiflu and IV 

hydration.  She was also started on Levaquin for possible pneumonia.  Because 

of her fall and x-ray of the thoracic spine and rib series was done.  Thoracic 

spine showed no acute abnormalities.  There was indication there may be a left 

sixth rib fracture.  She is sore in this area.  At the time my evaluation she 

is sitting up in resting comfortably in bed.  She is in no significant 

distress.  She has had no syncopal or presyncopal symptoms since her admission.

  An EEG has just been performed.





Objective





- Vital Signs


Vital signs: 


 Vital Signs











Temp  96.5 F L  02/15/18 07:00


 


Pulse  104 H  02/15/18 08:58


 


Resp  18   02/15/18 07:00


 


BP  127/57   02/15/18 07:00


 


Pulse Ox  94 L  02/15/18 07:00








 Intake & Output











 02/14/18 02/15/18 02/15/18





 18:59 06:59 18:59


 


Intake Total  650 


 


Output Total  1200 


 


Balance  -550 


 


Weight 49.895 kg  


 


Intake:   


 


  Oral  650 


 


Output:   


 


  Urine  1200 


 


Other:   


 


  Voiding Method  Toilet 


 


  # Voids  1 














- Constitutional


General appearance: Present: cooperative, mild distress, thin





- EENT


Eyes: Present: EOMI, PERRLA.  Absent: abnormal pupil, ptosis


ENT: Present: hearing grossly normal





- Neck


Neck: Present: normal ROM.  Absent: rigidity





- Respiratory


Respiratory: negative: wheezing, prolonged expiration, prolonged inspiration





- Cardiovascular


Rhythm: regular





- Gastrointestinal


General gastrointestinal: Absent: distended, tenderness





- Neurologic


Neurologic Comment(s): 





She is alert awake and oriented 3.  Speech-language are normal.  There is no 

facial asymmetry.  Strength is full in bilateral upper lower extremities.  

There is no sensory deficit.  No tremors or seizure-like activities are seen.





- Labs


CBC & Chem 7: 


 02/15/18 08:09





 02/15/18 08:09


Labs: 


 Abnormal Lab Results - Last 24 Hours (Table)











  02/14/18 02/15/18 02/15/18 Range/Units





  22:00 08:09 08:09 


 


Lymphocytes #   0.6 L   (1.0-4.8)  k/uL


 


Creatinine    0.45 L  (0.52-1.04)  mg/dL


 


Glucose    159 H  (74-99)  mg/dL


 


Urine Protein  1+ H    (Negative)  


 


Urine Mucus  Occasional H    (None)  /hpf








 Microbiology - Last 24 Hours (Table)











 02/14/18 22:00 Urine Culture - Preliminary





 Urine,Voided 














Assessment and Plan


(1) Febrile respiratory illness


Current Visit: Yes   Status: Acute   Code(s): J98.9 - RESPIRATORY DISORDER, 

UNSPECIFIED; R50.9 - FEVER, UNSPECIFIED   SNOMED Code(s): 59889692


   





(2) Rib pain


Current Visit: Yes   Status: Acute   Code(s): R07.81 - PLEURODYNIA   SNOMED Code

(s): 158968175


   





(3) Rib fracture


Current Visit: Yes   Status: Acute   Code(s): S22.39XA - FRACTURE OF ONE RIB, 

UNSP SIDE, INIT FOR CLOS FX   SNOMED Code(s): 10791776


   





(4) COPD (chronic obstructive pulmonary disease)


Current Visit: Yes   Status: Chronic   Code(s): J44.9 - CHRONIC OBSTRUCTIVE 

PULMONARY DISEASE, UNSPECIFIED   SNOMED Code(s): 55545306


   





(5) Tobacco dependence


Current Visit: Yes   Status: Chronic   Code(s): F17.200 - NICOTINE DEPENDENCE, 

UNSPECIFIED, UNCOMPLICATED   SNOMED Code(s): 39941846


   





(6) Influenza A


Current Visit: Yes   Status: Acute   Code(s): J10.1 - FLU DUE TO OTH IDENT 

INFLUENZA VIRUS W OTH RESP MANIFEST   SNOMED Code(s): 515531821


   





(7) Syncope


Current Visit: Yes   Status: Acute   Code(s): R55 - SYNCOPE AND COLLAPSE   

SNOMED Code(s): 374990762


   


Plan: 





The patient has had a syncopal episode.  This is likely due to her upper 

respiratory illnesses compounded by her chronic obstructive pulmonary disease.  

Recommend supportive care.  Recommend continue antibiotics Tamiflu and IV 

hydration.  Probably not much to be done about her rib fracture, but consider 

orthopedic referral if pain is severe.  Barring any of her seen abnormalities 

on her EEG she is cleared from a neurological standpoint, and we will follow 

her up in outpatient setting.





I have performed a history and physical on the above patient.  I have reviewed 

the above note, and agree.

## 2018-02-15 NOTE — CONS
CONSULTATION



DATE OF CONSULTATION:

2018



CHIEF COMPLAINT:

Syncope.



HISTORY OF PRESENT ILLNESS:

The patient is a pleasant 58-year-old,  female, who is being evaluated today

on 2018 by the Neurology Service per the request of Dr. España for a syncopal

spell.  The patient was brought into [facility] Emergency Room after she was

found on the kitchen floor unconscious by her .  The patient states that she was

quite fatigued that morning and remembers feeling lightheaded and short of breath.  She

does have history of chronic obstructive pulmonary disease.  She also reports having

some fevers although she never checked her temperature.  When her  found her, he

was able to wake her up but she was somewhat confused.  She did not have any sphincter

incontinence and no seizure-like activity was witnessed.  She did not have any tongue

biting.  She denies any previous history of seizures.  In the emergency room, she was

found to have a temperature of 102.8.  Her oxygen saturation was 87%.  A CT scan of the

brain was done which showed no intracranial abnormalities.  A chest x-ray was done,

which showed evidence of possible pneumonitis.  A flu test was done and she was

positive for influenza A virus and negative for influenza B.  She was started on

Tamiflu and IV hydration and admitted for further workup and management.  The patient

was also started on Levaquin for possible pneumonia. At the time of my evaluation, she

is sitting up in her bed and appears to be in no acute distress.  She denies any

recurrence of any syncopal or presyncopal symptoms.  She is complaining of some

posterior rib pain in the thoracic region along with some thoracic spine pain.  Her CBC

and cardiac enzymes were normal.  Her comprehensive metabolic profile showed slightly

elevated BUN at 21 and glucose at 131.



PAST MEDICAL HISTORY:

Chronic obstructive pulmonary disease, asthma, coronary artery disease, history of deep

venous thrombosis, fibromyalgia, gastroesophageal reflux disease, hypertension,

dyslipidemia, arthritis, history of thyroid nodule, skin cancer, history of

appendectomy, , cholecystectomy, coronary artery bypass grafting,

hysterectomy, joint replacement surgery, cervical spine fusion, iliac stenting,

depression.



SOCIAL HISTORY:

The patient is a current every day smoker.  She denies any alcohol or drug use.



FAMILY HISTORY:

Positive for cancer, stroke and heart disease.



HOME MEDICATIONS:

Reviewed in the chart.



ALLERGIES:

IV DYE, ASPIRIN, KEFLEX, NSAIDS, PENICILLIN, BAND-AIDS.



REVIEW OF SYSTEMS:

CONSTITUTIONAL:  As mentioned above.

EYES:  Negative.

ENT:  Negative.

CARDIOVASCULAR:  Negative.

RESPIRATORY:  As mentioned above.

NEUROLOGICAL:  As mentioned above.  She denies any lateralizing numbness or weakness.

GASTROINTESTINAL:  Positive for occasional heartburn.

GENITOURINARY:  Negative.

PSYCHIATRIC:  Positive for history of depression.

MUSCULOSKELETAL:  Positive for recurrent joint pain and spine pain.  Also, as mentioned

above.

ENDOCRINE:  Negative.

DERMATOLOGICAL:  Positive for history of skin cancer.



PHYSICAL EXAM:

Vital signs show a temperature of 100.4, pulse 98, respiration 18, blood pressure

133/62.

GENERAL APPEARANCE:  The patient is a thin, elderly,  female, who appears to

be in no acute distress.

HEENT:  Normocephalic, atraumatic, no facial asymmetry is seen.

NECK:  Supple with no masses felt.

CARDIOVASCULAR:  Regular rate and rhythm.

ABDOMEN:  Nontender, nondistended.

EXTREMITIES:  Showed no edema or clubbing.

NEUROLOGICAL EXAM:  The patient is alert, aware, and oriented x3.  Speech and language

are normal.  Strength is full in all 4 extremities.  Sensory exam was normal to light

touch in all 4 extremities.  No facial asymmetry seen on cranial nerve testing.  No

tremors or seizure-like activity is seen.



IMPRESSION:

1. Syncopal spell.

2. Febrile illness.

3. Influenza A virus infection.

4. Thoracic spine pain and rib pain.

5. Chronic obstructive pulmonary disease exacerbation.

6. Tobacco dependence.



RECOMMENDATION:

The patient did have a syncopal episode, but the exact etiology is unknown at this

time.  She does remember feeling fatigued and lightheaded prior to the episode.  This

could be related to her chronic obstructive pulmonary disease exacerbation, respiratory

tract infection, influenza virus infection, and dehydration.  Her CT scan of the brain

was reviewed and it showed no acute findings.  Continue antibiotics, Tamiflu, and IV

hydration.  Continue neuro checks.  An EEG has been ordered.  She is complaining of

some posterior left rib pain and thoracic spine pain.  I do recommend radiological

testing to rule out any acute fractures.  The patient was counseled extensively on

tobacco cessation.  I will continue to follow with you.  Further recommendations to

follow.



Thank you for allowing me to participate in the care of your patient.  If you have any

questions, please feel free to contact me.





[email or web address] / IJN: [id number] / Job#: [id number]

## 2018-02-16 VITALS — RESPIRATION RATE: 18 BRPM

## 2018-02-16 RX ADMIN — IPRATROPIUM BROMIDE SCH MG: 0.5 SOLUTION RESPIRATORY (INHALATION) at 08:13

## 2018-02-16 RX ADMIN — OSELTAMIVIR PHOSPHATE SCH MG: 75 CAPSULE ORAL at 21:02

## 2018-02-16 RX ADMIN — LORATADINE SCH MG: 10 TABLET ORAL at 07:38

## 2018-02-16 RX ADMIN — METHYLPREDNISOLONE SODIUM SUCCINATE SCH MG: 125 INJECTION, POWDER, FOR SOLUTION INTRAMUSCULAR; INTRAVENOUS at 11:34

## 2018-02-16 RX ADMIN — HYDROCHLOROTHIAZIDE SCH MG: 25 TABLET ORAL at 07:38

## 2018-02-16 RX ADMIN — LEVOFLOXACIN SCH MG: 500 TABLET, FILM COATED ORAL at 13:34

## 2018-02-16 RX ADMIN — ENOXAPARIN SODIUM SCH MG: 40 INJECTION SUBCUTANEOUS at 07:38

## 2018-02-16 RX ADMIN — IPRATROPIUM BROMIDE SCH MG: 0.5 SOLUTION RESPIRATORY (INHALATION) at 12:04

## 2018-02-16 RX ADMIN — METHYLPREDNISOLONE SODIUM SUCCINATE SCH MG: 125 INJECTION, POWDER, FOR SOLUTION INTRAMUSCULAR; INTRAVENOUS at 00:11

## 2018-02-16 RX ADMIN — OXYCODONE AND ACETAMINOPHEN PRN EACH: 5; 325 TABLET ORAL at 13:34

## 2018-02-16 RX ADMIN — CARVEDILOL SCH MG: 12.5 TABLET, FILM COATED ORAL at 16:40

## 2018-02-16 RX ADMIN — PANTOPRAZOLE SODIUM SCH MG: 40 TABLET, DELAYED RELEASE ORAL at 07:38

## 2018-02-16 RX ADMIN — IPRATROPIUM BROMIDE SCH MG: 0.5 SOLUTION RESPIRATORY (INHALATION) at 15:48

## 2018-02-16 RX ADMIN — METHYLPREDNISOLONE SODIUM SUCCINATE SCH MG: 125 INJECTION, POWDER, FOR SOLUTION INTRAMUSCULAR; INTRAVENOUS at 23:19

## 2018-02-16 RX ADMIN — BUDESONIDE SCH MG: 1 SUSPENSION RESPIRATORY (INHALATION) at 19:34

## 2018-02-16 RX ADMIN — METHYLPREDNISOLONE SODIUM SUCCINATE SCH MG: 125 INJECTION, POWDER, FOR SOLUTION INTRAMUSCULAR; INTRAVENOUS at 06:23

## 2018-02-16 RX ADMIN — OSELTAMIVIR PHOSPHATE SCH MG: 75 CAPSULE ORAL at 07:38

## 2018-02-16 RX ADMIN — METHYLPREDNISOLONE SODIUM SUCCINATE SCH MG: 125 INJECTION, POWDER, FOR SOLUTION INTRAMUSCULAR; INTRAVENOUS at 17:56

## 2018-02-16 RX ADMIN — CLOPIDOGREL BISULFATE SCH MG: 75 TABLET ORAL at 07:38

## 2018-02-16 RX ADMIN — IPRATROPIUM BROMIDE SCH MG: 0.5 SOLUTION RESPIRATORY (INHALATION) at 19:33

## 2018-02-16 RX ADMIN — NICOTINE SCH PATCH: 21 PATCH, EXTENDED RELEASE TRANSDERMAL at 07:37

## 2018-02-16 RX ADMIN — PANTOPRAZOLE SODIUM SCH MG: 40 TABLET, DELAYED RELEASE ORAL at 21:03

## 2018-02-16 RX ADMIN — CARVEDILOL SCH MG: 12.5 TABLET, FILM COATED ORAL at 07:38

## 2018-02-16 RX ADMIN — PRAVASTATIN SODIUM SCH MG: 20 TABLET ORAL at 07:38

## 2018-02-16 RX ADMIN — MORPHINE SULFATE PRN MG: 30 TABLET, FILM COATED, EXTENDED RELEASE ORAL at 06:27

## 2018-02-16 RX ADMIN — BUDESONIDE SCH MG: 1 SUSPENSION RESPIRATORY (INHALATION) at 08:13

## 2018-02-16 RX ADMIN — LOSARTAN POTASSIUM SCH MG: 50 TABLET, FILM COATED ORAL at 07:38

## 2018-02-16 NOTE — P.PN
Subjective


Progress Note Date: 02/15/18


Principal diagnosis: 





Acute influenza A infection





Patient is a 58-year-old female with a known history of COPD not on home oxygen

, fibromyalgia, chronic pain, and severe peripheral vascular disease with 

history of I aortic and femoral popliteal surgeries was found on the kitchen 

floor by her  445 this morning she was quite altered mental status she 

was confused she Repeating things and family wanted to bring her to the 

hospital and she refused to come to the hospital at this state continued for 

about 5-6 hours when finally she agreed to come to the hospital.  Patient has 

been cough and cold for the past 2-3 days. she is complaining about shortness 

of breath and then she spiked a fever and her fever was 102.3 in the ER, 

complaining about chest pain complaining about shortness of breath and she has 

been coughing. no abdominal pain no frequency urgency dysuria.


Patient says that she might have sleep apnea on the floor and has been having 

some left ankle pain and otherwise denied any head injury or any other pain.  

Denied any history of seizures.  No history of CVA in the past.  No bladder or 

bowel incontinence.


Chest x-ray showed COPD and possible interstitial pneumonitis


CT head showed no acute intracranial process.





02/15/2018


Patient did improve clinically otherwise patient is still requiring oxygen by 

another cannula.  Patient has been afebrile now.  No commerce of chest pain or 

worsening shortness of breath.  Otherwise patient is eager to be discharged 

home.


X-ray of thoracic spine showed posterior sixth rib possible fracture





All other review of systems negative except about


Current medications reviewed.





Objective





- Vital Signs


Vital signs: 


 Vital Signs











Temp  98.5 F   02/15/18 15:00


 


Pulse  85   02/15/18 16:33


 


Resp  18   02/15/18 15:00


 


BP  134/69   02/15/18 15:00


 


Pulse Ox  92 L  02/15/18 15:00








 Intake & Output











 02/14/18 02/15/18 02/15/18





 18:59 06:59 18:59


 


Intake Total  650 1100


 


Output Total  1200 


 


Balance  -550 1100


 


Weight 49.895 kg  


 


Intake:   


 


  Oral  650 1100


 


Output:   


 


  Urine  1200 


 


Other:   


 


  Voiding Method  Toilet 


 


  # Voids  1 3














- Exam





PHYSICAL EXAMINATION: 


Patient is lying in the bed comfortably, no acute distress, awake alert and 

oriented.. 


HEENT: Normocephalic. Neck is supple. Pupils reactive. Nostrils clear. Oral 

cavity is moist. Ears reveal no drainage. 


Neck reveals no JVD, carotid bruits, or thyromegaly. 


CHEST EXAMINATION: Trachea is central. Symmetrical expansion.  Bilateral 

rhonchi and right breast crackles and diminished air entry. 


CARDIAC: Normal S1, S2 with no gallops. No murmurs 


ABDOMEN: Soft. Bowel sounds normal. No organomegaly. No abdominal bruits. 


Extremities: reveal no edema.  No clubbing or cyanosis


Neurologically awake, alert, oriented x3 with well-coordinated movements.  No 

focal deficits noted


Skin: No rash or skin lesions. 


Psychiatric: Cooperative.  Nonsuicidal


Musculoskeletal: No joint swelling or deformity.  Normal range of motion.








- Labs


CBC & Chem 7: 


 02/15/18 08:09





 02/15/18 08:09


Labs: 


 Abnormal Lab Results - Last 24 Hours (Table)











  02/14/18 02/15/18 02/15/18 Range/Units





  22:00 08:09 08:09 


 


Lymphocytes #   0.6 L   (1.0-4.8)  k/uL


 


Creatinine    0.45 L  (0.52-1.04)  mg/dL


 


Glucose    159 H  (74-99)  mg/dL


 


Urine Protein  1+ H    (Negative)  


 


Urine Mucus  Occasional H    (None)  /hpf








 Microbiology - Last 24 Hours (Table)











 02/14/18 14:00 Blood Culture - Preliminary





 Blood    No Growth after 24 hours


 


 02/14/18 22:00 Urine Culture - Preliminary





 Urine,Voided 














Assessment and Plan


Assessment: 





Altered mental status and found on floor.  Resolved now.  Unlikely 

encephalopathy.


Syncope likely due to dehydration and volume depletion and infection.  Ruled 

out acute CVA. CT head negative


Acute influenza A infection


History of severe peripheral vascular disease and multiple aortic and femoral 

surgeries


COPD with exacerbation


Fibromyalgia and chronic pain


Hyperlipidemia


Hypertension


Osteoarthritis and history of cervical fusion


History of right foot gangrene


History of right carotid endarterectomy


DVT prophylaxis





Plan:


Patient be continued on IV fluids.  Continue the Tamiflu.  Currently patient is 

awake and oriented.  CT head negative for any acute CVA.  Continue the home 

medications.  Patient also will be continued on breathing treatments and IV 

steroids and empiric antibiotics.  Follow up closely.  Patient was seen by 

neurology..  Further recommendations based on the clinical course.


Time with Patient: Greater than 30

## 2018-02-16 NOTE — P.PN
Subjective


Progress Note Date: 02/16/18


Principal diagnosis: 





Acute influenza A infection





Patient is a 58-year-old female with a known history of COPD not on home oxygen

, fibromyalgia, chronic pain, and severe peripheral vascular disease with 

history of I aortic and femoral popliteal surgeries was found on the kitchen 

floor by her  445 this morning she was quite altered mental status she 

was confused she Repeating things and family wanted to bring her to the 

hospital and she refused to come to the hospital at this state continued for 

about 5-6 hours when finally she agreed to come to the hospital.  Patient has 

been cough and cold for the past 2-3 days. she is complaining about shortness 

of breath and then she spiked a fever and her fever was 102.3 in the ER, 

complaining about chest pain complaining about shortness of breath and she has 

been coughing. no abdominal pain no frequency urgency dysuria.


Patient says that she might have sleep apnea on the floor and has been having 

some left ankle pain and otherwise denied any head injury or any other pain.  

Denied any history of seizures.  No history of CVA in the past.  No bladder or 

bowel incontinence.


Chest x-ray showed COPD and possible interstitial pneumonitis


CT head showed no acute intracranial process.





02/15/2018


Patient did improve clinically otherwise patient is still requiring oxygen by 

another cannula.  Patient has been afebrile now.  No commerce of chest pain or 

worsening shortness of breath.  Otherwise patient is eager to be discharged 

home.


X-ray of thoracic spine showed posterior sixth rib possible fracture.





02/16/2018


Reason status improved today.  Shortness of breath with ambulation.  Otherwise 

saturating well on room air.  Continues to be on IV steroids.  Anticipate 

discharge tomorrow with tapping steroids and Tamiflu course.  Patient will need 

to follow with pulmonary clinic.  Otherwise patient wants to be discharged.





All other review of systems negative except about


Current medications reviewed.





Objective





- Vital Signs


Vital signs: 


 Vital Signs











Temp  98.1 F   02/16/18 14:54


 


Pulse  84   02/16/18 19:43


 


Resp  18   02/16/18 14:54


 


BP  142/72   02/16/18 14:54


 


Pulse Ox  94 L  02/16/18 14:54








 Intake & Output











 02/16/18 02/16/18 02/17/18





 06:59 18:59 06:59


 


Intake Total 980  


 


Balance 980  


 


Intake:   


 


  Oral 980  


 


Other:   


 


  Voiding Method Toilet Toilet 


 


  # Voids 2 2 














- Exam





PHYSICAL EXAMINATION: 


Patient is lying in the bed comfortably, no acute distress, awake alert and 

oriented.. 


HEENT: Normocephalic. Neck is supple. Pupils reactive. Nostrils clear. Oral 

cavity is moist. Ears reveal no drainage. 


Neck reveals no JVD, carotid bruits, or thyromegaly. 


CHEST EXAMINATION: Trachea is central. Symmetrical expansion.  Minimal right 

basilar crackles.  Diminished air entry basally bilaterally. 


CARDIAC: Normal S1, S2 with no gallops. No murmurs 


ABDOMEN: Soft. Bowel sounds normal. No organomegaly. No abdominal bruits. 


Extremities: reveal no edema.  No clubbing or cyanosis


Neurologically awake, alert, oriented x3 with well-coordinated movements.  No 

focal deficits noted


Skin: No rash or skin lesions. 


Psychiatric: Cooperative.  Nonsuicidal


Musculoskeletal: No joint swelling or deformity.  Normal range of motion.








- Labs


CBC & Chem 7: 


 02/15/18 08:09





 02/15/18 08:09


Labs: 


 Microbiology - Last 24 Hours (Table)











 02/14/18 14:00 Blood Culture - Preliminary





 Blood    No Growth after 48 hours


 


 02/14/18 22:00 Urine Culture - Final





 Urine,Voided 














Assessment and Plan


Assessment: 





Altered mental status and found on floor.  Resolved now.  Unlikely 

encephalopathy.


Syncope likely due to dehydration and volume depletion and infection.  Ruled 

out acute CVA. CT head negative


Acute influenza A infection


History of severe peripheral vascular disease and multiple aortic and femoral 

surgeries


COPD with exacerbation


Fibromyalgia and chronic pain


Hyperlipidemia


Hypertension


Osteoarthritis and history of cervical fusion


History of right foot gangrene


History of right carotid endarterectomy


DVT prophylaxis





Plan:


Patient be continued on IV fluids.  Continue the Tamiflu.  Currently patient is 

awake and oriented.  CT head negative for any acute CVA.  Continue the home 

medications.  Patient also will be continued on breathing treatments and IV 

steroids and empiric antibiotics.  Follow up closely.  Patient was seen by 

neurology..  Further recommendations based on the clinical course.


Time with Patient: Greater than 30

## 2018-02-17 VITALS — DIASTOLIC BLOOD PRESSURE: 88 MMHG | SYSTOLIC BLOOD PRESSURE: 183 MMHG | TEMPERATURE: 97 F

## 2018-02-17 VITALS — HEART RATE: 80 BPM

## 2018-02-17 RX ADMIN — IPRATROPIUM BROMIDE SCH MG: 0.5 SOLUTION RESPIRATORY (INHALATION) at 12:10

## 2018-02-17 RX ADMIN — MORPHINE SULFATE PRN MG: 30 TABLET, FILM COATED, EXTENDED RELEASE ORAL at 05:04

## 2018-02-17 RX ADMIN — METHYLPREDNISOLONE SODIUM SUCCINATE SCH MG: 125 INJECTION, POWDER, FOR SOLUTION INTRAMUSCULAR; INTRAVENOUS at 06:38

## 2018-02-17 RX ADMIN — IPRATROPIUM BROMIDE SCH MG: 0.5 SOLUTION RESPIRATORY (INHALATION) at 08:17

## 2018-02-17 RX ADMIN — OSELTAMIVIR PHOSPHATE SCH MG: 75 CAPSULE ORAL at 09:23

## 2018-02-17 RX ADMIN — SENNOSIDES SCH MG: 8.6 TABLET, FILM COATED ORAL at 05:05

## 2018-02-17 RX ADMIN — PANTOPRAZOLE SODIUM SCH MG: 40 TABLET, DELAYED RELEASE ORAL at 09:24

## 2018-02-17 RX ADMIN — CARVEDILOL SCH MG: 12.5 TABLET, FILM COATED ORAL at 09:23

## 2018-02-17 RX ADMIN — NICOTINE SCH PATCH: 21 PATCH, EXTENDED RELEASE TRANSDERMAL at 09:23

## 2018-02-17 RX ADMIN — LEVOFLOXACIN SCH MG: 500 TABLET, FILM COATED ORAL at 11:29

## 2018-02-17 RX ADMIN — HYDROCHLOROTHIAZIDE SCH MG: 25 TABLET ORAL at 09:24

## 2018-02-17 RX ADMIN — LORATADINE SCH MG: 10 TABLET ORAL at 09:24

## 2018-02-17 RX ADMIN — ENOXAPARIN SODIUM SCH MG: 40 INJECTION SUBCUTANEOUS at 09:24

## 2018-02-17 RX ADMIN — PRAVASTATIN SODIUM SCH MG: 20 TABLET ORAL at 09:24

## 2018-02-17 RX ADMIN — SENNOSIDES SCH MG: 8.6 TABLET, FILM COATED ORAL at 09:23

## 2018-02-17 RX ADMIN — METHYLPREDNISOLONE SODIUM SUCCINATE SCH MG: 125 INJECTION, POWDER, FOR SOLUTION INTRAMUSCULAR; INTRAVENOUS at 11:30

## 2018-02-17 RX ADMIN — LEVOFLOXACIN SCH MG: 500 TABLET, FILM COATED ORAL at 11:31

## 2018-02-17 RX ADMIN — OXYCODONE AND ACETAMINOPHEN PRN EACH: 5; 325 TABLET ORAL at 11:36

## 2018-02-17 RX ADMIN — LOSARTAN POTASSIUM SCH MG: 50 TABLET, FILM COATED ORAL at 09:24

## 2018-02-17 RX ADMIN — BUDESONIDE SCH MG: 1 SUSPENSION RESPIRATORY (INHALATION) at 08:17

## 2018-02-17 RX ADMIN — CLOPIDOGREL BISULFATE SCH MG: 75 TABLET ORAL at 09:24

## 2018-02-17 NOTE — P.PN
Subjective


Progress Note Date: 02/17/18


Principal diagnosis: 


Acute influenza A pneumonia, acute hypoxic respirator failure, severe COPD 

emphysema, status post fall, left sixth rib fracture acute, severe degree of 

peripheral arterial disease with multiple interventions in the past, 

hypertension and hypertensive cardiovascular disease dyslipidemia





02/17/2018, patient seen and evaluated examined during the rounds cuff 

congestion shortness breath is improved patient able to get up and move around 

in the room severity or shortness of breath and cuff congestion spiking fever 

has improved as well she continued to wishes to go home she has been consult at 

length about smoking cessation





02/16/2018, patient seen and evaluated examined during the rounds cuff 

congestion shortness was slightly improved patient has been consistently wishes 

to go home however it appears that she is not ready she still needs the 

antibiotics breathing treatment and supportive care as high risk and therefore 

readmission as discussed with the patient's family and patient at length 

patient is agreeable to stay for now laboratory data and radiographic studies 

reviewed and compared








Patient is a 58-year-old female with a known history of COPD not on home oxygen

, fibromyalgia, chronic pain, and severe peripheral vascular disease with 

history of I aortic and femoral popliteal surgeries was found on the kitchen 

floor by her  445 this morning she was quite altered mental status she 

was confused she Repeating things and family wanted to bring her to the 

hospital and she refused to come to the hospital at this state continued for 

about 5-6 hours when finally she agreed to come to the hospital.  Patient has 

been cough and cold for the past 2-3 days. she is complaining about shortness 

of breath and then she spiked a fever and her fever was 102.3 in the ER, 

complaining about chest pain complaining about shortness of breath and she has 

been coughing. no abdominal pain no frequency urgency dysuria.


Patient says that she might have sleep apnea on the floor and has been having 

some left ankle pain and otherwise denied any head injury or any other pain.  

Denied any history of seizures.  No history of CVA in the past.  No bladder or 

bowel incontinence.


Chest x-ray showed COPD and possible interstitial pneumonitis


CT head showed no acute intracranial process








Objective





- Vital Signs


Vital signs: 


 Vital Signs











Temp  97.0 F L  02/17/18 07:00


 


Pulse  80   02/17/18 12:25


 


Resp  18   02/17/18 08:00


 


BP  183/88   02/17/18 07:00


 


Pulse Ox  91 L  02/17/18 07:00








 Intake & Output











 02/16/18 02/17/18 02/17/18





 18:59 06:59 18:59


 


Weight   49.895 kg


 


Other:   


 


  Voiding Method Toilet Toilet Toilet


 


  # Voids 2 2 














- Exam





PHYSICAL EXAMINATION: 


Patient is lying in the bed comfortably, no acute distress, awake alert and 

oriented.  Family present at bedside


HEENT: Normocephalic. Neck is supple. Pupils reactive. Nostrils clear. Oral 

cavity is moist. Ears reveal no drainage. 


Neck reveals no JVD, carotid bruits, or thyromegaly. 


CHEST EXAMINATION: Trachea is central. Symmetrical expansion.  Bilateral 

rhonchi and crackles and diminished air entry bilaterally with prolonged 

expiratory phase, improved compared to yesterday exam significant


CARDIAC: Normal S1, S2 with no gallops. No murmurs 


ABDOMEN: Soft. Bowel sounds normal. No organomegaly. No abdominal bruits. 


Extremities: reveal no edema.  No clubbing or cyanosis


Neurologically awake, alert, oriented x3 with well-coordinated movements.  No 

focal deficits noted


Skin: No rash or skin lesions. 


Psychiatric: Cooperative.  Nonsuicidal


Musculoskeletal: No joint swelling or deformity.  Normal range of motion.











- Labs


CBC & Chem 7: 


 02/15/18 08:09





 02/15/18 08:09


Labs: 


 Microbiology - Last 24 Hours (Table)











 02/14/18 14:00 Blood Culture - Preliminary





 Blood    No Growth after 48 hours


 


 02/14/18 22:00 Urine Culture - Final





 Urine,Voided 














Assessment and Plan


Assessment: 


Acute hypoxic respiratory failure





Acute influenza A pneumonia





Possible secondary bacterial pneumonia





Acute COPD exacerbation with baseline severe COPD emphysema





Status post fall syncope likely related to respiratory process with secondary 

intravascular volume depletion and dehydration





Peripheral arterial disease with multiple interventions in the past





Hypertension hypertensive cardiovascular disease dyslipidemia





Generalized weakness and medical debility due to multiple comorbidities and 

acute pneumonia











Plan: 


Continue breathing treatments antibiotics and prednisone IV can be switched to 

by mouth can be discharged home on tapering steroids antibiotics patient does 

have a nebulizer machine and medicine at home will recommend follow-up in one 

week as outpatient 





Continue gentle hydration increase activity as tolerated





Specific therapy with Tamiflu to finish prescribed course of therapy





Patient is not ready for discharge continue current supportive care care plan 

discussed with the primary service patient and family at length





Time with Patient: Greater than 30

## 2018-02-17 NOTE — CONS
CONSULTATION



DATE OF SERVICE:

February 15, 2018.



This is a delayed dictation. Services performed February 15.



REASON FOR CONSULT:

Shortness of breath, severe COPD, and acute flu.



HISTORY OF PRESENTING ILLNESS:

Ms. Jenae Romo is a 58-year-old female with a history of severe COPD and end-stage

lung disease.  The patient has not been feeling well for 5-6 days with increasing

cough, congestion, shortness of breath, and feverish feeling.  The patient was found on

the floor on the day of admission by the .  However, patient was readily

arousable.  She spiked a fever of 102.  The patient was extremely reluctant to come

into the hospital and eventually at the end of the day was brought into the emergency

department where she was found to have a fever of 101, and subsequently admitted to the

hospital for further evaluation intervention and treatment.  In addition to above,

patient has been complaining of cough which is dry and nonproductive, shortness of

breath and has issues associated with some chest tightness and lower thoracic wall pain

due to excessive coughing.  The patient underwent a chest x-ray and CT scan of the

head, which failed to reveal any acute intracranial abnormality, EKG performed in the

emergency department revealed normal sinus rhythm, no significant ST or T-wave changes

seen.  Chest x-ray performed in the emergency department revealed prominent

interstitium, interstitial edema with interstitial pneumonitis and pneumonia cannot be

excluded.  Subsequently, patient had a rib x-ray which revealed posterior fixed left

rib fracture.  Thoracic spine x-ray revealed no acute fracture or osteopenia and

osteoarthritis was seen.



PAST MEDICAL HISTORY:

Is significant for chronic persistent asthma, severe COPD, coronary artery disease,

history of deep venous thrombosis, chronic fibromyalgia and GERD, dyslipidemia,

hypertension, hypertensive cardiovascular disease.  Degenerative joint disease.

Osteoarthritis.



PAST SURGICAL HISTORY:

Significant for cholecystectomy, , hysterectomy, history of orthopedic

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

cervical fusion, status post right aortofemoral iliac bypass with runoff, status post

right carotid endarterectomy.  History of EGD, colonoscopy, cervical fusion, status of

left knee total arthroplasty.  Umbilical hernia repair, bilateral carpal tunnel

release.  History of breast biopsy which was negative for neoplastic process.



FAMILY HISTORY AND SOCIAL HISTORY:

Smokes half to 1 pack per day.  Strong history of coronary artery disease and renal

disease on the paternal side. History of coronary artery disease on maternal side as

well as sibling with history of stroke. Strong history of lung cancer on maternal side.



ALLERGIES:

Include iodinated contrast agent, cephalexin, NSAID, penicillin, Band-Aid.



MEDICATIONS:

At home include Coreg 25 mg p.o. 2 times a day.  Cetirizine 10 mg daily,

hydrochlorothiazide 25 mg daily, losartan 100 mg daily.  Morphine 30 mg as needed

extended release.  Prilosec 20 mg p.o. 2 times a day.  Pravachol 20 mg daily.  Plavix

75 mg daily.  Atrovent HFA 2 puffs 4 times a day.  The patient has been on a Z-Chau at

the time of admission, along with baclofen 10 mg 3 times a day.  Fluticasone, DuoNeb

unit dose 4 times a day, oxycodone, and prednisone 20 mg daily.



REVIEW OF SYSTEMS:

Otherwise unremarkable and noncontributory.  No history of seizure, likely loss of

consciousness, hemiparesis.  No history of bowel or bladder incontinence.



CURRENT MEDICATIONS WHILE IN THE HOSPITAL:

Includes DuoNeb unit dose 4 times a day and also on Pulmicort 2 times a day.  Coreg 25

mg b.i.d., Plavix 75 mg daily, Lovenox 40 mg daily.  Hydrochlorothiazide 25 mg daily.

Levaquin 500 mg daily.  Loratadine 10 mg daily.  Losartan 100 mg daily.  Milk of

magnesia.  MS Contin.  Nicotine patch.  Zofran.  Tamiflu 75 b.i.d.  Also on oxycodone.

Protonix 40 mg daily.  Pravastatin 20 mg daily and IV Solu-Medrol 60 mg q.6 hours as

needed.



EXAMINATION:

Most recent vitals include T-max of 102.8, blood pressure 131/82, respiratory rate is

20, heart rate is 98, temperature as above 102.8, saturation 95%.  He did drop down to

87%.

HEENT: Atraumatic, normocephalic.  Pharynx is clear.  Narrow pharyngeal opening is

present.

NECK:  Supple without lymphadenopathy or jugular venous distention or carotid bruits.

LUNGS: Bilateral coarse breath sounds, inspiratory and expiratory rhonchi and crackles

are present.

HEART:  Regular rate and rhythm, S1, S2 audible.

ABDOMEN:  Soft.  No rebound, rigidity.

EXTREMITIES: +1 pedal pulses.

NEUROLOGIC EXAMINATION: Otherwise awake, alert.  No focal neurological deficit.



LABORATORY DATA:

Reviewed.  White cell count is 7300, hemoglobin and hematocrit 12 and 38, platelet

count 234,000.  PT, PTT, INR within normal limits.  Chemistry normal with BUN 21.  LFTs

are normal.  Urinalysis: A few WBC, RBC is present.  Influenza A positive by RNA.



IMPRESSION:

1. Acute hypoxic respiratory failure related to influenza A pneumonia.

2. Influenza A pneumonia.

3. Possible secondary bacterial pneumonia cannot be excluded.

4. Severe chronic obstructive pulmonary disease, emphysema with acute on chronic

    hypoxic respiratory failure.

5. Peripheral arterial disease with history of multiple intervention in the past.

6. Status post fall and loss of consciousness, likely related to respiratory

    processes.

7. Sixth left rib fracture.

8. End-stage lung disease, significant severe chronic obstructive pulmonary disease

    and emphysema.



PLAN:

I agree with IV steroids, breathing treatment and antibiotics.  Continue supportive

care.  Maintain patient on DVT peptic ulcer disease prophylaxis.  Increase activity as

tolerated.  Continue other supportive care.  Will follow clinical course closely with

further recommendations pending.





MMODL / IJN: [id number] / Job#: [id number]

## 2018-02-17 NOTE — P.PN
Subjective


Progress Note Date: 02/16/18 (Late entry note)


Principal diagnosis: 


Acute influenza A pneumonia, acute hypoxic respirator failure, severe COPD 

emphysema, status post fall, left sixth rib fracture acute, severe degree of 

peripheral arterial disease with multiple interventions in the past, 

hypertension and hypertensive cardiovascular disease dyslipidemia





02/16/2018, patient seen and evaluated examined during the rounds cuff 

congestion shortness was slightly improved patient has been consistently wishes 

to go home however it appears that she is not ready she still needs the 

antibiotics breathing treatment and supportive care as high risk and therefore 

readmission as discussed with the patient's family and patient at length 

patient is agreeable to stay for now laboratory data and radiographic studies 

reviewed and compared








Patient is a 58-year-old female with a known history of COPD not on home oxygen

, fibromyalgia, chronic pain, and severe peripheral vascular disease with 

history of I aortic and femoral popliteal surgeries was found on the kitchen 

floor by her  445 this morning she was quite altered mental status she 

was confused she Repeating things and family wanted to bring her to the 

hospital and she refused to come to the hospital at this state continued for 

about 5-6 hours when finally she agreed to come to the hospital.  Patient has 

been cough and cold for the past 2-3 days. she is complaining about shortness 

of breath and then she spiked a fever and her fever was 102.3 in the ER, 

complaining about chest pain complaining about shortness of breath and she has 

been coughing. no abdominal pain no frequency urgency dysuria.


Patient says that she might have sleep apnea on the floor and has been having 

some left ankle pain and otherwise denied any head injury or any other pain.  

Denied any history of seizures.  No history of CVA in the past.  No bladder or 

bowel incontinence.


Chest x-ray showed COPD and possible interstitial pneumonitis


CT head showed no acute intracranial process








Objective





- Vital Signs


Vital signs: 


 Vital Signs











Temp  97.0 F L  02/17/18 07:00


 


Pulse  80   02/17/18 12:25


 


Resp  18   02/17/18 08:00


 


BP  183/88   02/17/18 07:00


 


Pulse Ox  91 L  02/17/18 07:00








 Intake & Output











 02/16/18 02/17/18 02/17/18





 18:59 06:59 18:59


 


Weight   49.895 kg


 


Other:   


 


  Voiding Method Toilet Toilet Toilet


 


  # Voids 2 2 














- Exam





PHYSICAL EXAMINATION: 


Patient is lying in the bed comfortably, no acute distress, awake alert and 

oriented.  Family present at bedside


HEENT: Normocephalic. Neck is supple. Pupils reactive. Nostrils clear. Oral 

cavity is moist. Ears reveal no drainage. 


Neck reveals no JVD, carotid bruits, or thyromegaly. 


CHEST EXAMINATION: Trachea is central. Symmetrical expansion.  Bilateral 

rhonchi and crackles and diminished air entry bilaterally with prolonged 

expiratory phase


CARDIAC: Normal S1, S2 with no gallops. No murmurs 


ABDOMEN: Soft. Bowel sounds normal. No organomegaly. No abdominal bruits. 


Extremities: reveal no edema.  No clubbing or cyanosis


Neurologically awake, alert, oriented x3 with well-coordinated movements.  No 

focal deficits noted


Skin: No rash or skin lesions. 


Psychiatric: Cooperative.  Nonsuicidal


Musculoskeletal: No joint swelling or deformity.  Normal range of motion.











- Labs


CBC & Chem 7: 


 02/15/18 08:09





 02/15/18 08:09


Labs: 


 Microbiology - Last 24 Hours (Table)











 02/14/18 14:00 Blood Culture - Preliminary





 Blood    No Growth after 48 hours


 


 02/14/18 22:00 Urine Culture - Final





 Urine,Voided 














- Imaging and Cardiology


Chest x-ray: report reviewed, image reviewed (Finding as noted above)





Assessment and Plan


Assessment: 


Acute hypoxic respiratory failure





Acute influenza A pneumonia





Possible secondary bacterial pneumonia





Acute COPD exacerbation with baseline severe COPD emphysema





Status post fall syncope likely related to respiratory process with secondary 

intravascular volume depletion and dehydration





Peripheral arterial disease with multiple interventions in the past





Hypertension hypertensive cardiovascular disease dyslipidemia











Plan: 


Continue breathing treatments IV steroids and bronchodilator





Continue gentle hydration increase activity as tolerated





Specific therapy with Tamiflu





Patient is not ready for discharge continue current supportive care care plan 

discussed with the primary service patient and family at length





Time with Patient: Greater than 30

## 2018-02-21 NOTE — EEG
ELECTROENCEPHALOGRAM REPORT



DATE OF SERVICE:

02/15/2018



REASON FOR TESTING:

Syncope.



DESCRIPTION OF THE PROCEDURE:

This EEG was performed using a 21 channel digital electroencephalograph, following

international 10-20 system.



DESCRIPTION OF THE RECORDING:

From the beginning of the tracing, and with patient's eyes closed, the background

rhythm was mostly consisting of 9 Hz alpha frequency in the posterior occipital leads.

No obvious asymmetry is seen.  Photic stimulation was performed with a good driving

response seen.  No pathological waves were elicited.  Hyperventilation was not

performed.  Rare movement artifacts are seen.  The patient remains awake throughout the

tracing.  No epileptiform discharges were seen.  Her EKG lead showed a regular rate and

rhythm.



INTERPRETATION:

This awake EEG can be considered within normal limits.  There was no asymmetry seen.

No epileptiform discharges were noticed.  The absence of epileptiform discharges does

not rule out the diagnosis of epilepsy, therefore clinical correlation is recommended.





MMLITOL / IJBRAD: [id number] / Job#: [id number]

## 2019-09-05 ENCOUNTER — HOSPITAL ENCOUNTER (OUTPATIENT)
Dept: HOSPITAL 47 - RADXRMAIN | Age: 59
Discharge: HOME | End: 2019-09-05
Attending: FAMILY MEDICINE
Payer: COMMERCIAL

## 2019-09-05 DIAGNOSIS — J44.1: Primary | ICD-10-CM

## 2019-09-05 PROCEDURE — 71046 X-RAY EXAM CHEST 2 VIEWS: CPT

## 2019-09-05 NOTE — XR
EXAMINATION TYPE: XR chest 2V

 

DATE OF EXAM: 9/5/2019

 

COMPARISON: February 15, 2019

 

HISTORY: Shortness of breath

 

TECHNIQUE:  Frontal and lateral views of the chest are obtained.

 

FINDINGS:

 

Scattered senescent parenchymal changes noted. Hyperinflation compatible with COPD. 

 

No evidence for infiltrate. No evidence for atelectasis.

 

Heart size is stable.

 

Mediastinal structures are stable and grossly unremarkable.

 

No evidence for hilar prominence.

 

Degenerative changes dorsal spine. 

 

IMPRESSION:

1. No evidence for acute pulmonary disease.

## 2020-01-24 ENCOUNTER — HOSPITAL ENCOUNTER (OUTPATIENT)
Dept: HOSPITAL 47 - RADMAMWWP | Age: 60
Discharge: HOME | End: 2020-01-24
Attending: FAMILY MEDICINE
Payer: COMMERCIAL

## 2020-01-24 DIAGNOSIS — Z12.31: Primary | ICD-10-CM

## 2020-01-24 PROCEDURE — 77067 SCR MAMMO BI INCL CAD: CPT

## 2020-01-27 NOTE — MM
Reason for exam: screening  (asymptomatic).

Last mammogram was performed 21 years and 4 months ago.



History:

Patient is postmenopausal.

Benign excisional biopsy of the left breast, October 16, 1998.  Excisional biopsy 

of the right breast.

Took hormonal contraceptives beginning at age 13.



Physical Findings:

A clinical breast exam by your physician is recommended on an annual basis and 

results should be correlated with mammographic findings.



MG Screening Mammo w CAD

Bilateral CC and MLO view(s) were taken.

No prior studies available for comparison.

The breast tissue is heterogeneously dense. This may lower the sensitivity of 

mammography.

Finding: There are typically benign round calcifications. Focal asymmetry left 

posterior MLO view 7-8cm from the nipple.





ASSESSMENT: Incomplete: need additional imaging evaluation, BI-RAD 0



RECOMMENDATION:

Special view mammogram of the left breast.



If lesion persists on supplemental views, image directed ultrasound is 

recommended.



[facility] will attempt to contact patient to return for supplemental 

views and ultrasound if indicated.

## 2020-02-04 ENCOUNTER — HOSPITAL ENCOUNTER (OUTPATIENT)
Dept: HOSPITAL 47 - RADMAMWWP | Age: 60
Discharge: HOME | End: 2020-02-04
Attending: FAMILY MEDICINE
Payer: COMMERCIAL

## 2020-02-04 DIAGNOSIS — R92.8: Primary | ICD-10-CM

## 2020-02-04 PROCEDURE — 77065 DX MAMMO INCL CAD UNI: CPT

## 2020-02-04 NOTE — USB
Reason for exam: additional evaluation requested from abnormal screening.



History:

Patient is postmenopausal and history of other cancer.

Benign excisional biopsy of the left breast, October 16, 1998.  Excisional biopsy 

of the right breast.

Took hormonal contraceptives beginning at age 13.



US Breast Workup Limited LT

Left limited breast ultrasound including focal area of concern, retroareolar and 

axilla demonstrates heterogenous tissue at area of scar, biopsy recommended and a 

0.3 x 0.3 x 0.3cm lesion too small to characterize at superior axilla, skin line 

anechoic lesion, possibly cutaneous lesion, 6 month follow up recommended. Scanned

11-4 o'clock.



These results were verbally communicated with the patient and result sheet given 

to the patient on 2/4/20.





ASSESSMENT: Suspicious, BI-RAD 4



RECOMMENDATION:

Surgical consultation and ultrasound core biopsy of the left breast.



Called Dr. Roe's office with mammographic findings and has scheduled an 

appointment for the patient for 2/27/20 at 10:00 with Dr. Cohen.

Biopsy scheduled for 2/27/20 at 12:20.



PRELIMINARY REPORT CALLED AND FAXED TO DR. COHEN ON 2/4/20.

## 2020-02-04 NOTE — MM
Reason for exam: additional evaluation requested from abnormal screening.

Last mammogram was performed less than 1 month ago.



History:

Patient is postmenopausal and history of other cancer.

Benign excisional biopsy of the left breast, October 16, 1998.  Excisional biopsy 

of the right breast.

Took hormonal contraceptives beginning at age 13.



Physical Findings:

Nurse did not find any significant physical abnormalities on exam.



MG Work Up Mamm w CAD LT

LM and spot compression MLO view(s) were taken of the left breast.

Prior study comparison: January 24, 2020, bilateral MG screening mammo w CAD.

The breast tissue is heterogeneously dense. This may lower the sensitivity of 

mammography.  Regional/loosely grouped calcifications posterior upper outer 

quadrant. Asymmetric density posteriorly seen on screening not seen on compression

views. Global asymmetry upper outer quadrant. Ultrasound recommended.



These results were verbally communicated with the patient and result sheet given 

to the patient on 2/4/20.





ASSESSMENT: Incomplete: need additional imaging evaluation, BI-RAD 0



RECOMMENDATION:

Ultrasound of the left breast.

(11-4 o'clock)

## 2020-02-27 ENCOUNTER — HOSPITAL ENCOUNTER (OUTPATIENT)
Dept: HOSPITAL 47 - RADUSWWP | Age: 60
End: 2020-02-27
Attending: SURGERY
Payer: COMMERCIAL

## 2020-02-27 VITALS — HEART RATE: 88 BPM | SYSTOLIC BLOOD PRESSURE: 146 MMHG | DIASTOLIC BLOOD PRESSURE: 56 MMHG

## 2020-02-27 VITALS — TEMPERATURE: 98.7 F | RESPIRATION RATE: 12 BRPM

## 2020-02-27 DIAGNOSIS — Z88.6: ICD-10-CM

## 2020-02-27 DIAGNOSIS — R92.8: Primary | ICD-10-CM

## 2020-02-27 DIAGNOSIS — Z91.048: ICD-10-CM

## 2020-02-27 DIAGNOSIS — Z88.0: ICD-10-CM

## 2020-02-27 NOTE — USB
EXAMINATION TYPE: US discontinued breast bx LT

 

DATE OF EXAM: 2/27/2020

 

CLINICAL HISTORY: Breast mass by ultrasound.

 

TECHNIQUE: Real-time linear array sonography is performed over the left  breast for localization for 
mass. Real-time observation a real-time scanning is performed. This exam is compared to 2/4/2020

 

COMPARISON: Ultrasound left breast to 4/20/2020

 

FINDINGS: 

Slightly hypoechoic parenchymal tissue is partially visualized. However, this appears to extend into 
the parenchymal tissue smoothly. A definite solid lesion is not identified. Under real-time scanning 
and sonography persistent suspicious ultrasound abnormality is not identified. Monitoring is recommen
ded.

 

IMPRESSION: 

1. Left breast biopsy aborted prior to skin incision.

2. Probably Benign findings, left breast.

3. BI-RADS 3, ultrasound

 

Recommendations:

1. Clinical management of any palpable abnormality. Negative ultrasound should not preclude biopsy of
 suspicious palpable abnormalities.

2. In the absence of clinically suspicious findings, short-term follow-up left breast ultrasound with
 attention to the 2:00 position is recommended.

3. Patient should continue monthly self breast exam.

## 2020-09-02 ENCOUNTER — HOSPITAL ENCOUNTER (INPATIENT)
Dept: HOSPITAL 47 - EC | Age: 60
LOS: 1 days | Discharge: HOME | DRG: 390 | End: 2020-09-03
Attending: INTERNAL MEDICINE | Admitting: INTERNAL MEDICINE
Payer: COMMERCIAL

## 2020-09-02 ENCOUNTER — HOSPITAL ENCOUNTER (EMERGENCY)
Dept: HOSPITAL 47 - EC | Age: 60
Discharge: HOME | End: 2020-09-02
Payer: COMMERCIAL

## 2020-09-02 VITALS
TEMPERATURE: 98.6 F | HEART RATE: 94 BPM | SYSTOLIC BLOOD PRESSURE: 123 MMHG | RESPIRATION RATE: 17 BRPM | DIASTOLIC BLOOD PRESSURE: 73 MMHG

## 2020-09-02 DIAGNOSIS — Z91.041: ICD-10-CM

## 2020-09-02 DIAGNOSIS — G89.29: ICD-10-CM

## 2020-09-02 DIAGNOSIS — Z80.1: ICD-10-CM

## 2020-09-02 DIAGNOSIS — Z86.718: ICD-10-CM

## 2020-09-02 DIAGNOSIS — Z90.49: ICD-10-CM

## 2020-09-02 DIAGNOSIS — Z95.820: ICD-10-CM

## 2020-09-02 DIAGNOSIS — E04.1: ICD-10-CM

## 2020-09-02 DIAGNOSIS — I10: ICD-10-CM

## 2020-09-02 DIAGNOSIS — K21.9: ICD-10-CM

## 2020-09-02 DIAGNOSIS — Z79.51: ICD-10-CM

## 2020-09-02 DIAGNOSIS — Z84.1: ICD-10-CM

## 2020-09-02 DIAGNOSIS — Z90.710: ICD-10-CM

## 2020-09-02 DIAGNOSIS — M79.7: ICD-10-CM

## 2020-09-02 DIAGNOSIS — M19.90: ICD-10-CM

## 2020-09-02 DIAGNOSIS — Z96.60: ICD-10-CM

## 2020-09-02 DIAGNOSIS — K52.9: ICD-10-CM

## 2020-09-02 DIAGNOSIS — Z79.891: ICD-10-CM

## 2020-09-02 DIAGNOSIS — K56.7: Primary | ICD-10-CM

## 2020-09-02 DIAGNOSIS — J44.9: ICD-10-CM

## 2020-09-02 DIAGNOSIS — Z85.828: ICD-10-CM

## 2020-09-02 DIAGNOSIS — Z98.1: ICD-10-CM

## 2020-09-02 DIAGNOSIS — Z82.49: ICD-10-CM

## 2020-09-02 DIAGNOSIS — E78.5: ICD-10-CM

## 2020-09-02 DIAGNOSIS — Z88.6: ICD-10-CM

## 2020-09-02 DIAGNOSIS — I73.9: ICD-10-CM

## 2020-09-02 DIAGNOSIS — Z88.0: ICD-10-CM

## 2020-09-02 DIAGNOSIS — Z82.3: ICD-10-CM

## 2020-09-02 DIAGNOSIS — F17.200: ICD-10-CM

## 2020-09-02 DIAGNOSIS — I25.10: ICD-10-CM

## 2020-09-02 DIAGNOSIS — F32.9: ICD-10-CM

## 2020-09-02 DIAGNOSIS — Z98.890: ICD-10-CM

## 2020-09-02 DIAGNOSIS — D72.829: ICD-10-CM

## 2020-09-02 DIAGNOSIS — Z79.02: ICD-10-CM

## 2020-09-02 DIAGNOSIS — Z79.899: ICD-10-CM

## 2020-09-02 DIAGNOSIS — Z96.652: ICD-10-CM

## 2020-09-02 LAB
ALBUMIN SERPL-MCNC: 4.7 G/DL (ref 3.5–5)
ALBUMIN SERPL-MCNC: 4.9 G/DL (ref 3.5–5)
ALP SERPL-CCNC: 107 U/L (ref 38–126)
ALP SERPL-CCNC: 109 U/L (ref 38–126)
ALT SERPL-CCNC: 13 U/L (ref 4–34)
ALT SERPL-CCNC: 13 U/L (ref 4–34)
AMYLASE SERPL-CCNC: 48 U/L (ref 30–110)
ANION GAP SERPL CALC-SCNC: 9 MMOL/L
ANION GAP SERPL CALC-SCNC: 9 MMOL/L
APTT BLD: 23.2 SEC (ref 22–30)
AST SERPL-CCNC: 22 U/L (ref 14–36)
AST SERPL-CCNC: 25 U/L (ref 14–36)
BASOPHILS # BLD AUTO: 0 K/UL (ref 0–0.2)
BASOPHILS # BLD AUTO: 0.1 K/UL (ref 0–0.2)
BASOPHILS NFR BLD AUTO: 0 %
BASOPHILS NFR BLD AUTO: 1 %
BUN SERPL-SCNC: 17 MG/DL (ref 7–17)
BUN SERPL-SCNC: 19 MG/DL (ref 7–17)
CALCIUM SPEC-MCNC: 9.9 MG/DL (ref 8.4–10.2)
CALCIUM SPEC-MCNC: 9.9 MG/DL (ref 8.4–10.2)
CHLORIDE SERPL-SCNC: 101 MMOL/L (ref 98–107)
CHLORIDE SERPL-SCNC: 97 MMOL/L (ref 98–107)
CO2 SERPL-SCNC: 26 MMOL/L (ref 22–30)
CO2 SERPL-SCNC: 30 MMOL/L (ref 22–30)
EOSINOPHIL # BLD AUTO: 0 K/UL (ref 0–0.7)
EOSINOPHIL # BLD AUTO: 0.1 K/UL (ref 0–0.7)
EOSINOPHIL NFR BLD AUTO: 0 %
EOSINOPHIL NFR BLD AUTO: 1 %
ERYTHROCYTE [DISTWIDTH] IN BLOOD BY AUTOMATED COUNT: 4.71 M/UL (ref 3.8–5.4)
ERYTHROCYTE [DISTWIDTH] IN BLOOD BY AUTOMATED COUNT: 5.22 M/UL (ref 3.8–5.4)
ERYTHROCYTE [DISTWIDTH] IN BLOOD: 13 % (ref 11.5–15.5)
ERYTHROCYTE [DISTWIDTH] IN BLOOD: 13 % (ref 11.5–15.5)
GLUCOSE SERPL-MCNC: 130 MG/DL (ref 74–99)
GLUCOSE SERPL-MCNC: 135 MG/DL (ref 74–99)
HCT VFR BLD AUTO: 42.5 % (ref 34–46)
HCT VFR BLD AUTO: 47.4 % (ref 34–46)
HGB BLD-MCNC: 14.1 GM/DL (ref 11.4–16)
HGB BLD-MCNC: 15.6 GM/DL (ref 11.4–16)
INR PPP: 0.9 (ref ?–1.2)
LYMPHOCYTES # SPEC AUTO: 1.3 K/UL (ref 1–4.8)
LYMPHOCYTES # SPEC AUTO: 1.5 K/UL (ref 1–4.8)
LYMPHOCYTES NFR SPEC AUTO: 11 %
LYMPHOCYTES NFR SPEC AUTO: 12 %
MCH RBC QN AUTO: 29.9 PG (ref 25–35)
MCH RBC QN AUTO: 30.1 PG (ref 25–35)
MCHC RBC AUTO-ENTMCNC: 32.9 G/DL (ref 31–37)
MCHC RBC AUTO-ENTMCNC: 33.3 G/DL (ref 31–37)
MCV RBC AUTO: 90.3 FL (ref 80–100)
MCV RBC AUTO: 90.8 FL (ref 80–100)
MONOCYTES # BLD AUTO: 0.4 K/UL (ref 0–1)
MONOCYTES # BLD AUTO: 0.4 K/UL (ref 0–1)
MONOCYTES NFR BLD AUTO: 3 %
MONOCYTES NFR BLD AUTO: 4 %
NEUTROPHILS # BLD AUTO: 10 K/UL (ref 1.3–7.7)
NEUTROPHILS # BLD AUTO: 10.5 K/UL (ref 1.3–7.7)
NEUTROPHILS NFR BLD AUTO: 83 %
NEUTROPHILS NFR BLD AUTO: 84 %
PH UR: 8.5 [PH] (ref 5–8)
PLATELET # BLD AUTO: 306 K/UL (ref 150–450)
PLATELET # BLD AUTO: 317 K/UL (ref 150–450)
POTASSIUM SERPL-SCNC: 3.9 MMOL/L (ref 3.5–5.1)
POTASSIUM SERPL-SCNC: 4.1 MMOL/L (ref 3.5–5.1)
PROT SERPL-MCNC: 6.8 G/DL (ref 6.3–8.2)
PROT SERPL-MCNC: 7.1 G/DL (ref 6.3–8.2)
PT BLD: 9.4 SEC (ref 9–12)
SODIUM SERPL-SCNC: 136 MMOL/L (ref 137–145)
SODIUM SERPL-SCNC: 136 MMOL/L (ref 137–145)
SP GR UR: >1.05 (ref 1–1.03)
UROBILINOGEN UR QL STRIP: <2 MG/DL (ref ?–2)
WBC # BLD AUTO: 11.8 K/UL (ref 3.8–10.6)
WBC # BLD AUTO: 12.7 K/UL (ref 3.8–10.6)

## 2020-09-02 PROCEDURE — 96374 THER/PROPH/DIAG INJ IV PUSH: CPT

## 2020-09-02 PROCEDURE — 86900 BLOOD TYPING SEROLOGIC ABO: CPT

## 2020-09-02 PROCEDURE — 94640 AIRWAY INHALATION TREATMENT: CPT

## 2020-09-02 PROCEDURE — 85025 COMPLETE CBC W/AUTO DIFF WBC: CPT

## 2020-09-02 PROCEDURE — 85610 PROTHROMBIN TIME: CPT

## 2020-09-02 PROCEDURE — 83690 ASSAY OF LIPASE: CPT

## 2020-09-02 PROCEDURE — 93005 ELECTROCARDIOGRAM TRACING: CPT

## 2020-09-02 PROCEDURE — 71275 CT ANGIOGRAPHY CHEST: CPT

## 2020-09-02 PROCEDURE — 96375 TX/PRO/DX INJ NEW DRUG ADDON: CPT

## 2020-09-02 PROCEDURE — 83605 ASSAY OF LACTIC ACID: CPT

## 2020-09-02 PROCEDURE — 85730 THROMBOPLASTIN TIME PARTIAL: CPT

## 2020-09-02 PROCEDURE — 36415 COLL VENOUS BLD VENIPUNCTURE: CPT

## 2020-09-02 PROCEDURE — 96361 HYDRATE IV INFUSION ADD-ON: CPT

## 2020-09-02 PROCEDURE — 96376 TX/PRO/DX INJ SAME DRUG ADON: CPT

## 2020-09-02 PROCEDURE — 81003 URINALYSIS AUTO W/O SCOPE: CPT

## 2020-09-02 PROCEDURE — 74174 CTA ABD&PLVS W/CONTRAST: CPT

## 2020-09-02 PROCEDURE — 86850 RBC ANTIBODY SCREEN: CPT

## 2020-09-02 PROCEDURE — 99285 EMERGENCY DEPT VISIT HI MDM: CPT

## 2020-09-02 PROCEDURE — 80048 BASIC METABOLIC PNL TOTAL CA: CPT

## 2020-09-02 PROCEDURE — 74018 RADEX ABDOMEN 1 VIEW: CPT

## 2020-09-02 PROCEDURE — 80053 COMPREHEN METABOLIC PANEL: CPT

## 2020-09-02 PROCEDURE — 99284 EMERGENCY DEPT VISIT MOD MDM: CPT

## 2020-09-02 PROCEDURE — 86901 BLOOD TYPING SEROLOGIC RH(D): CPT

## 2020-09-02 PROCEDURE — 84484 ASSAY OF TROPONIN QUANT: CPT

## 2020-09-02 PROCEDURE — 82150 ASSAY OF AMYLASE: CPT

## 2020-09-02 RX ADMIN — CEFAZOLIN SCH MLS/HR: 330 INJECTION, POWDER, FOR SOLUTION INTRAMUSCULAR; INTRAVENOUS at 15:10

## 2020-09-02 RX ADMIN — HEPARIN SODIUM SCH UNIT: 5000 INJECTION, SOLUTION INTRAVENOUS; SUBCUTANEOUS at 22:57

## 2020-09-02 RX ADMIN — FAMOTIDINE SCH MG: 10 INJECTION, SOLUTION INTRAVENOUS at 02:13

## 2020-09-02 RX ADMIN — HEPARIN SODIUM SCH UNIT: 5000 INJECTION, SOLUTION INTRAVENOUS; SUBCUTANEOUS at 17:10

## 2020-09-02 RX ADMIN — IPRATROPIUM BROMIDE AND ALBUTEROL SULFATE PRN ML: .5; 3 SOLUTION RESPIRATORY (INHALATION) at 21:04

## 2020-09-02 RX ADMIN — FAMOTIDINE SCH MG: 10 INJECTION, SOLUTION INTRAVENOUS at 02:14

## 2020-09-02 RX ADMIN — MORPHINE SULFATE PRN MG: 4 INJECTION, SOLUTION INTRAMUSCULAR; INTRAVENOUS at 15:09

## 2020-09-02 RX ADMIN — MORPHINE SULFATE PRN MG: 4 INJECTION, SOLUTION INTRAMUSCULAR; INTRAVENOUS at 22:57

## 2020-09-02 RX ADMIN — MORPHINE SULFATE PRN MG: 4 INJECTION, SOLUTION INTRAMUSCULAR; INTRAVENOUS at 18:21

## 2020-09-02 NOTE — P.HPIM
History of Present Illness


H&P Date: 20


Chief Complaint: No abdominal pain.





Patient is a 60-year-old male with a known history of peripheral vascular 

disease with stenting aortic type II right


Iliac bypass, right carotid endarterectomy, osteoarthritis, chronic bronchitis, 

history of thyroid nodule, fibromyalgia, history of DVT, GERD, hypertension, 

hyperlipidemia, osteoarthritis and currently everyday smoker and depression 

presents to ER with complaints of abdominal pain.


Patient was in the ER last night with abdominal pain.  Patient has been having 

abdominal pain for the past 4 to 5 days.  Since yesterday she has been having 

multiple episodes of vomiting.  Vomiting is mainly nonbloody bilious.  Abdominal

pain is mainly diffuse without any distention.  Last bowel met was about 3 days 

ago otherwise patient is passing flatus.


Patient is on morphine and Percocet for chronic pain and fibromyalgia.





Abdominal x-ray showed correlate for ileus, enteritis.  Follow-up indicated.


EKG showed sinus tachycardia


Laboratory data showed WBC 11.8, hemoglobin 15.6 and platelets 317


Sodium 136, potassium 4.1, chloride 101 and BUN 19 and creatinine 0.54





Review of Systems





Constitutional: Patient denies any fever or chills .  No generalized weakness or

weight loss.  


Abdomen: Patient does have abdominal pain associate with nausea and vomiting.  

No diarrhea..


Cardiovascular: Patient denies any chest pain or short of breath no 

palpitations.


Respiratory: patient denied any cough is from production.  No shortness of 

breath


Neurologic: Patient denied any numbness or tingling headache.


Musculoskeletal: Patient denies any complaints of joint swelling or deformity.


Skin: Negative


Psychiatric: Negative


Endocrine: No heat or cold intolerance.  No recent weight gain.


Genitourinary: No dysuria or hematuria.


All other 14 point ROS negative except the above





Past Medical History


Past Medical History: Asthma, Coronary Artery Disease (CAD), Cancer, COPD, Deep 

Vein Thrombosis (DVT), Fibromyalgia, GERD/Reflux, Hyperlipidemia, Hypertension, 

Osteoarthritis (OA), Respiratory Disorder, Vascular Disorder


Additional Past Medical History / Comment(s): pad,chronic bronchitis, nodules on

thyroid, leukoplakia-lip and vocal cords. past" gangrene on right foot"


History of Any Multi-Drug Resistant Organisms: None Reported


Past Surgical History: Appendectomy,  Section, Cholecystectomy, 

Hysterectomy, Joint Replacement, Orthopedic Surgery


Additional Past Surgical History / Comment(s): leukoplakia removed from lip and 

vocal cords.cervical fusion, rt aorto iliac bypass,aortogram w/ runoff.  2015 had stenting of aorto to rt ext iliac bypass, rt foot 2nd toe 

reconstructive sx, rt carotid endarterectomy, egd/colonoscopy ,cervical 

fusion,lt knee replacment.lt elbow sx,umbilical hernia repair,bilcarpal tunnel 

release,lt and rt breast bx-neg


Past Anesthesia/Blood Transfusion Reactions: Previous Problems w/ Anesthesia, 

Postoperative Nausea & Vomiting (PONV)


Additional Past Anesthesia/Blood Transfusion Reaction / Comment(s): difficulty 

waking from anesthesia


Past Psychological History: Depression


Smoking Status: Current every day smoker


Past Alcohol Use History: None Reported


Past Drug Use History: None Reported





- Past Family History


  ** Mother


Family Medical History: Cancer, Coronary Artery Disease (CAD), Renal Disease


Additional Family Medical History / Comment(s): CABG, LUNG CANCER,





  ** Brother(s)


Family Medical History: Coronary Artery Disease (CAD), CVA/TIA


Additional Family Medical History / Comment(s): 1 BROTHER CABG, 1 BROTHER STROKE





  ** Father


Family Medical History: Coronary Artery Disease (CAD), Renal Disease


Additional Family Medical History / Comment(s): X4 OPEN HEART SX. HUGE CARDIAC 

HX ON DADS SIDE OF FAMILY





Medications and Allergies


                                Home Medications











 Medication  Instructions  Recorded  Confirmed  Type


 


Carvedilol 25 mg PO BID 12/11/15 09/02/20 History


 


Cetirizine HCl [Zyrtec] 10 mg PO DAILY 12/11/15 09/02/20 History


 


Losartan Potassium 100 mg PO DAILY 12/11/15 09/02/20 History


 


Morphine Sulfate [Morphine Sulfate 30 mg PO DAILY 12/11/15 09/02/20 History





ER]    


 


Omeprazole [PriLOSEC] 20 mg PO DAILY 12/11/15 09/02/20 History


 


hydroCHLOROthiazide 25 mg PO DAILY 12/11/15 09/02/20 History


 


Clopidogrel [Plavix] 75 mg PO DAILY #30 tab 12/17/15 09/02/20 Rx


 


Ipratropium Bromide [Atrovent Hfa] 2 puff INHALATION RT-QID 16 

History


 


Ipratropium-Albuterol Nebulize 3 ml INHALATION RT-QID PRN 18 

History





[Duoneb 0.5 mg-3 mg/3 ml Soln]    


 


oxyCODONE-APAP 5-325MG [Percocet 1 tab PO PC-LUNCH PRN 18 History





5-325 mg]    


 


Pravastatin Sodium [Pravachol] 40 mg PO HS 20 History








                                    Allergies











Allergy/AdvReac Type Severity Reaction Status Date / Time


 


aspirin Allergy  Anaphylaxis Verified 20 14:00


 


NSAIDS (Non-Steroidal Allergy  Anaphylaxis Verified 20 14:00





Anti-Inflamma     


 


Penicillins Allergy  Anaphylaxis Verified 20 14:00


 


Iodinated Contrast Media AdvReac Mild cold Verified 20 14:00





[Iodinated Contrast Media -   symptoms,  





IV Dye]   runny  





   nose,  





   itchy eyes  





   cough  














Physical Exam


Vitals: 


                                   Vital Signs











  Temp Pulse Resp BP Pulse Ox


 


 20 15:05  98.4 F  110 H  18  162/98  92 L


 


 20 14:00   101 H  18  153/81  93 L


 


 20 13:22  99.1 F  113 H  18  178/97  93 L








                                Intake and Output











 20





 06:59 14:59 22:59


 


Other:   


 


  Weight  58.06 kg 














PHYSICAL EXAMINATION: 


Patient is lying in the bed comfortably, no acute distress, awake alert and 

oriented.. 


HEENT: Normocephalic. Neck is supple. Pupils reactive. Nostrils clear. Oral 

cavity is moist. Ears reveal no drainage. 


Neck reveals no JVD, carotid bruits, or thyromegaly. 


CHEST EXAMINATION: Trachea is central. Symmetrical expansion. Lung fields clear 

to auscultation and percussion. 


CARDIAC: Normal S1, S2 with no gallops. No murmurs 


ABDOMEN: Soft. Diffuse tenderness.  No guarding or rigidity.  Bowel sounds are 

present but hypoactive.. No organomegaly. No abdominal bruits. 


Extremities: reveal no edema.  No clubbing or cyanosis


Neurologically awake, alert, oriented x3 with well-coordinated movements.  No 

focal deficits noted


Skin: No rash or skin lesions. 


Psychiatric: Coperative.  Nonsuicidal


Musculoskeletal: No joint swelling or deformity.  Normal range of motion.








Results


CBC & Chem 7: 


                                 20 13:43





                                 20 13:43


Labs: 


                  Abnormal Lab Results - Last 24 Hours (Table)











  20 Range/Units





  13:43 13:43 


 


WBC  11.8 H   (3.8-10.6)  k/uL


 


Hct  47.4 H   (34.0-46.0)  %


 


Neutrophils #  10.0 H   (1.3-7.7)  k/uL


 


Sodium   136 L  (137-145)  mmol/L


 


BUN   19 H  (7-17)  mg/dL


 


Glucose   135 H  (74-99)  mg/dL














Thrombosis Risk Factor Assmnt





- DVT/VTE Prophylaxis


DVT/VTE Prophylaxis: Pharmacologic Prophylaxis ordered





Assessment and Plan


Assessment: 





Abdominal pain secondary to ileus.


Chronic pain patient is currently on Percocet 10 morphine p.o. at home


Peripheral vascular disease with history of aortoiliac bypass and also stent 

placement


History of carotid endarterectomy


COPD not exacerbation


Currently everyday smoker


Depression


Fibromyalgia


Hypertension


Hyperlipidemia


GERD


Osteoarthritis


History of leukoplakia removed from lip and vocal cords.


DVT prophylaxis with heparin subcu





Plan:


Patient will be continued on IV fluids and nothing by mouth.  Continued pain man

agement with morphine.  General surgery was consulted.


Continued home medications and follow-up closely.


Time with Patient: Greater than 30

## 2020-09-02 NOTE — ED
General Adult HPI





- General


Source: patient, RN notes reviewed


Mode of arrival: EMS





<Remy Temple P - Last Filed: 20 03:17>





<Aditi Garcia P - Last Filed: 20 05:15>





- General


Chief complaint: Abdominal Pain


Stated complaint: Abd Pain


Time Seen by Provider: 20 01:56





- History of Present Illness


Initial comments: 





60-year-old female with a past medical history of aortic bypass and stenting in 

 and 20/15 presents to the emergency department for epigastric pain.  

Patient states that around 3 hours that he started to have upper abdominal pain.

 Patient reports she feels bloated.  Patient is nauseous and has vomited.  She 

denies diarrhea.  She had a normal bowel movement earlier today.  Patient denies

numbness or tingling to extremities.  Denies chest pain or shortness of 

breath.Patient has no other complaints at this time including shortness of 

breath, chest pain, mausea or vomiting, headache, or visual changes. 

(Remy Temple)





- Related Data


                                Home Medications











 Medication  Instructions  Recorded  Confirmed


 


Carvedilol 25 mg PO BID 12/11/15 02/27/20


 


Cetirizine HCl [Zyrtec] 10 mg PO DAILY 12/11/15 02/27/20


 


Losartan Potassium 100 mg PO DAILY 12/11/15 02/27/20


 


Morphine Sulfate [Morphine Sulfate 30 mg PO DAILY PRN 12/11/15 02/27/20





ER]   


 


Omeprazole [PriLOSEC] 20 mg PO DAILY 12/11/15 02/27/20


 


Pravastatin Sodium [Pravachol] 20 mg PO DAILY 12/11/15 02/27/20


 


hydroCHLOROthiazide 25 mg PO DAILY 12/11/15 02/27/20


 


Ipratropium Bromide [Atrovent Hfa] 2 puff INHALATION RT-QID 16


 


Ipratropium-Albuterol Nebulize 3 ml INHALATION RT-QID PRN 18





[Duoneb 0.5 mg-3 mg/3 ml Soln]   


 


oxyCODONE-APAP 5-325MG [Percocet 1 tab PO DAILY PRN 18





5-325 mg]   


 


Omega-3 Fatty Acids/Fish Oil [Fish 1 each PO DAILY 20





Oil 1,000 mg Softgel]   


 


diphenhydrAMINE [Benadryl] 25 mg PO DAILY PRN 20


 


guaiFENesin [Mucinex] 1,200 mg PO DAILY 20








                                  Previous Rx's











 Medication  Instructions  Recorded


 


Clopidogrel [Plavix] 75 mg PO DAILY #30 tab 12/17/15











                                    Allergies











Allergy/AdvReac Type Severity Reaction Status Date / Time


 


Iodinated Contrast Media Allergy Mild cold Verified 20 11:22





[Iodinated Contrast Media -   symptoms,  





IV Dye]   runny  





   nose,  





   itchy eyes  





   cough  


 


aspirin Allergy  Anaphylaxis Verified 20 11:22


 


NSAIDS (Non-Steroidal AdvReac  Anaphylaxis Verified 20 11:22





Anti-Inflamma     


 


Penicillins AdvReac  Anaphylaxis Verified 20 11:22














Review of Systems


ROS Other: All systems not noted in ROS Statement are negative.





<Remy Temple P - Last Filed: 20 03:17>


ROS Other: All systems not noted in ROS Statement are negative.





<Aditi Garcia P - Last Filed: 20 05:15>


ROS Statement: 


Those systems with pertinent positive or pertinent negative responses have been 

documented in the HPI.








Past Medical History


Past Medical History: Asthma, Coronary Artery Disease (CAD), Cancer, COPD, Deep 

Vein Thrombosis (DVT), Fibromyalgia, GERD/Reflux, Hyperlipidemia, Hypertension, 

Osteoarthritis (OA), Respiratory Disorder, Vascular Disorder


Additional Past Medical History / Comment(s): pad,chronic bronchitis, nodules on

thyroid, leukoplakia-lip and vocal cords. past" gangrene on right foot"


History of Any Multi-Drug Resistant Organisms: None Reported


Past Surgical History: Appendectomy,  Section, Cholecystectomy, 

Hysterectomy, Joint Replacement, Orthopedic Surgery


Additional Past Surgical History / Comment(s): leukoplakia removed from lip and 

vocal cords.cervical fusion, rt aorto iliac bypass,aortogram w/ runoff.  2015 had stenting of aorto to rt ext iliac bypass, rt foot 2nd toe 

reconstructive sx, rt carotid endarterectomy, egd/colonoscopy ,cervical 

fusion,lt knee replacment.lt elbow sx,umbilical hernia repair,bilcarpal tunnel 

release,lt and rt breast bx-neg


Past Anesthesia/Blood Transfusion Reactions: Previous Problems w/ Anesthesia, 

Postoperative Nausea & Vomiting (PONV)


Additional Past Anesthesia/Blood Transfusion Reaction / Comment(s): difficulty 

waking from anesthesia


Past Psychological History: Depression


Past Alcohol Use History: None Reported


Past Drug Use History: None Reported





- Past Family History


  ** Mother


Family Medical History: Cancer, Coronary Artery Disease (CAD), Renal Disease


Additional Family Medical History / Comment(s): CABG, LUNG CANCER,





  ** Brother(s)


Family Medical History: Coronary Artery Disease (CAD), CVA/TIA


Additional Family Medical History / Comment(s): 1 BROTHER CABG, 1 BROTHER STROKE





  ** Father


Family Medical History: Coronary Artery Disease (CAD), Renal Disease


Additional Family Medical History / Comment(s): X4 OPEN HEART SX. HUGE CARDIAC 

HX ON DADS SIDE OF FAMILY





<Remy Temple P - Last Filed: 20 03:17>





General Exam


General appearance: alert, in no apparent distress


Head exam: Present: atraumatic


Eye exam: Present: normal appearance, PERRL, EOMI.  Absent: scleral icterus, co

njunctival injection, periorbital swelling


ENT exam: Present: normal exam, mucous membranes moist


Neck exam: Present: normal inspection, full ROM.  Absent: tenderness, 

meningismus, lymphadenopathy


Respiratory exam: Present: normal lung sounds bilaterally.  Absent: respiratory 

distress, wheezes, rales, rhonchi, stridor


Cardiovascular Exam: Present: regular rate, normal rhythm, normal heart sounds. 

Absent: bradycardia, tachycardia, irregular rhythm


GI/Abdominal exam: Present: soft, distended, tenderness (Tenderness noted to 

epigastric area.), normal bowel sounds.  Absent: guarding, rebound, rigid, 

pulsatile mass


Neurological exam: Present: alert





<Remy Temple P - Last Filed: 20 03:17>





Course


                                   Vital Signs











  20





  01:58 03:00


 


Temperature 97.8 F 


 


Pulse Rate 95 92


 


Respiratory 29 H 19





Rate  


 


Blood Pressure 184/89 141/63


 


O2 Sat by Pulse 95 94 L





Oximetry  














EKG Findings





- EKG Comments:


EKG Findings:: Normal sinus rhythm, ventricular rate 88, MS interval 140, QTC 

464





<Remy Temple P - Last Filed: 20 03:17>





Medical Decision Making





- Lab Data


Result diagrams: 


                                 20 02:07





                                 20 02:07





<Remy Temple P - Last Filed: 20 03:17>





- Lab Data


Result diagrams: 


                                 20 02:07





                                 20 02:07





<Aditi Garcia P - Last Filed: 20 05:15>





- Lab Data


                                   Lab Results











  20 Range/Units





  02:06 02:07 02:07 


 


WBC   12.7 H   (3.8-10.6)  k/uL


 


RBC   4.71   (3.80-5.40)  m/uL


 


Hgb   14.1   (11.4-16.0)  gm/dL


 


Hct   42.5   (34.0-46.0)  %


 


MCV   90.3   (80.0-100.0)  fL


 


MCH   30.1   (25.0-35.0)  pg


 


MCHC   33.3   (31.0-37.0)  g/dL


 


RDW   13.0   (11.5-15.5)  %


 


Plt Count   306   (150-450)  k/uL


 


Neutrophils %   83   %


 


Lymphocytes %   12   %


 


Monocytes %   3   %


 


Eosinophils %   1   %


 


Basophils %   1   %


 


Neutrophils #   10.5 H   (1.3-7.7)  k/uL


 


Lymphocytes #   1.5   (1.0-4.8)  k/uL


 


Monocytes #   0.4   (0-1.0)  k/uL


 


Eosinophils #   0.1   (0-0.7)  k/uL


 


Basophils #   0.1   (0-0.2)  k/uL


 


PT    9.4  (9.0-12.0)  sec


 


INR    0.9  (<1.2)  


 


APTT    23.2  (22.0-30.0)  sec


 


Sodium     (137-145)  mmol/L


 


Potassium     (3.5-5.1)  mmol/L


 


Chloride     ()  mmol/L


 


Carbon Dioxide     (22-30)  mmol/L


 


Anion Gap     mmol/L


 


BUN     (7-17)  mg/dL


 


Creatinine     (0.52-1.04)  mg/dL


 


Est GFR (CKD-EPI)AfAm     (>60 ml/min/1.73 sqM)  


 


Est GFR (CKD-EPI)NonAf     (>60 ml/min/1.73 sqM)  


 


Glucose     (74-99)  mg/dL


 


Plasma Lactic Acid Waldemar     (0.7-2.0)  mmol/L


 


Calcium     (8.4-10.2)  mg/dL


 


Total Bilirubin     (0.2-1.3)  mg/dL


 


AST     (14-36)  U/L


 


ALT     (4-34)  U/L


 


Alkaline Phosphatase     ()  U/L


 


Troponin I     (0.000-0.034)  ng/mL


 


Total Protein     (6.3-8.2)  g/dL


 


Albumin     (3.5-5.0)  g/dL


 


Amylase     ()  U/L


 


Lipase     ()  U/L


 


Urine Color  Light Yellow    


 


Urine Appearance  Clear    (Clear)  


 


Urine pH  8.5 H    (5.0-8.0)  


 


Ur Specific Gravity  >1.050 H    (1.001-1.035)  


 


Urine Protein  Negative    (Negative)  


 


Urine Glucose (UA)  Negative    (Negative)  


 


Urine Ketones  Negative    (Negative)  


 


Urine Blood  Negative    (Negative)  


 


Urine Nitrite  Negative    (Negative)  


 


Urine Bilirubin  Negative    (Negative)  


 


Urine Urobilinogen  <2.0    (<2.0)  mg/dL


 


Ur Leukocyte Esterase  Negative    (Negative)  














  20 Range/Units





  02:07 02:07 02:07 


 


WBC     (3.8-10.6)  k/uL


 


RBC     (3.80-5.40)  m/uL


 


Hgb     (11.4-16.0)  gm/dL


 


Hct     (34.0-46.0)  %


 


MCV     (80.0-100.0)  fL


 


MCH     (25.0-35.0)  pg


 


MCHC     (31.0-37.0)  g/dL


 


RDW     (11.5-15.5)  %


 


Plt Count     (150-450)  k/uL


 


Neutrophils %     %


 


Lymphocytes %     %


 


Monocytes %     %


 


Eosinophils %     %


 


Basophils %     %


 


Neutrophils #     (1.3-7.7)  k/uL


 


Lymphocytes #     (1.0-4.8)  k/uL


 


Monocytes #     (0-1.0)  k/uL


 


Eosinophils #     (0-0.7)  k/uL


 


Basophils #     (0-0.2)  k/uL


 


PT     (9.0-12.0)  sec


 


INR     (<1.2)  


 


APTT     (22.0-30.0)  sec


 


Sodium  136 L    (137-145)  mmol/L


 


Potassium  3.9    (3.5-5.1)  mmol/L


 


Chloride  97 L    ()  mmol/L


 


Carbon Dioxide  30    (22-30)  mmol/L


 


Anion Gap  9    mmol/L


 


BUN  17    (7-17)  mg/dL


 


Creatinine  0.51 L    (0.52-1.04)  mg/dL


 


Est GFR (CKD-EPI)AfAm  >90    (>60 ml/min/1.73 sqM)  


 


Est GFR (CKD-EPI)NonAf  >90    (>60 ml/min/1.73 sqM)  


 


Glucose  130 H    (74-99)  mg/dL


 


Plasma Lactic Acid Waldemar   2.1 H*   (0.7-2.0)  mmol/L


 


Calcium  9.9    (8.4-10.2)  mg/dL


 


Total Bilirubin  0.4    (0.2-1.3)  mg/dL


 


AST  22    (14-36)  U/L


 


ALT  13    (4-34)  U/L


 


Alkaline Phosphatase  107    ()  U/L


 


Troponin I    <0.012  (0.000-0.034)  ng/mL


 


Total Protein  6.8    (6.3-8.2)  g/dL


 


Albumin  4.7    (3.5-5.0)  g/dL


 


Amylase  48    ()  U/L


 


Lipase  129    ()  U/L


 


Urine Color     


 


Urine Appearance     (Clear)  


 


Urine pH     (5.0-8.0)  


 


Ur Specific Gravity     (1.001-1.035)  


 


Urine Protein     (Negative)  


 


Urine Glucose (UA)     (Negative)  


 


Urine Ketones     (Negative)  


 


Urine Blood     (Negative)  


 


Urine Nitrite     (Negative)  


 


Urine Bilirubin     (Negative)  


 


Urine Urobilinogen     (<2.0)  mg/dL


 


Ur Leukocyte Esterase     (Negative)  














Disposition





<Remy Temple P - Last Filed: 20 03:17>


Is patient prescribed a controlled substance at d/c from ED?: No





<Aditi Garcia P - Last Filed: 20 05:15>


Clinical Impression: 


 Ileus





Disposition: HOME SELF-CARE


Condition: Stable


Additional Instructions: 


As we discussed your graft looks good on CTA





You do have some ileus, I recommend clear liquid diet for 2-3 days





Take zofran as needed for nausea, return to the ER for vomiting





Follow up with PCP for re-evaluation later this week





Return to the ER for any worsening or development of new or concerning symptoms


Referrals: 


Parmjit Roe MD [Primary Care Provider] - 1-2 days

## 2020-09-02 NOTE — XR
Abdomen

 

HISTORY: Vomiting and ileus

 

Single frontal view of the abdomen submitted and correlated prior exam 2/25/2014

 

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 
Long segment stent extends along the right common iliac artery occlusion, multiple surgical clips pre
sent in the pelvis. There is contrast material present within the urinary bladder. Air-fluid levels a
re present without bowel distention. Lung bases are clear.

 

IMPRESSION: Correlate for ileus, enteritis, follow-up as indicated.

## 2020-09-02 NOTE — ED
General Adult HPI





- General


Chief complaint: Nausea/Vomiting/Diarrhea


Stated complaint: recheck- vomiting


Time Seen by Provider: 20 13:33


Source: patient


Mode of arrival: wheelchair


Limitations: no limitations





- History of Present Illness


Initial comments: 


Dictation was produced using dragon dictation software. please excuse any 

grammatical, word or spelling errors. 





This patient was cared for during a federal and state declared state of e

mergency secondary to Covid 19





Chief Complaint: 60-year-old female peripheral vascular disease, chronic pain, 

multiple herniated disc presents with abdominal pain.





History of Present Illness: 60-year-old female she was seen here in emergency 

department last night.  She has been having symptoms of abdominal pain for the 

last 2-3 days.  Patient reports that her symptoms have been getting worse.  She 

has been having emesis every 20-30 minutes for the last couple days.  Patient 

was seen here last night where she had laboratory evaluation and CT imaging of 

the aorta.  CT appeared to be stable over was incidental finding of ileus.  She 

was discharged with return precautions.  Patient reports that her emesis is 

nonbilious not bloody.  She hasn't had a regular bowel movement 2-3 days.  

Patient has history of chronic pain.  She takes morphine and Percocet regularly.








The ROS documented in this emergency department record has been reviewed and 

confirmed by me.  Those systems with pertinent positive or negative responses 

have been documented in the HPI.  All other systems are other negative and/or 

noncontributory.








PHYSICAL EXAM:


General Impression: Alert and oriented x3, not in acute distress


HEENT: Normocephalic atraumatic, extra-ocular movements intact, pupils equal and

reactive to light bilaterally, mucous membranes moist.


Cardiovascular: Heart regular rate and rhythm


Chest: Able to complete full sentences, no retractions, no tachypnea


Abdomen: abdomen soft, non-tender, non-distended, no organomegaly


Musculoskeletal: Pulses present and equal in all extremities, no peripheral 

edema


Motor:  no focal deficits noted


Neurological: CN II-XII grossly intact, no focal motor or sensory deficits noted


Skin: Intact with no visualized rashes


Psych: Normal affect and mood





ED course: 60-year-old female who was seen last night for abdominal pain and had

ileus on CT per presents to the emergency department for abdominal pain and 

nausea vomiting.  Vital signs upon arrival shows heart rate of 113, oxygen 

saturation 93%.


Laboratory evaluation obtained.  Mild leukocytosis of 11.8 which was stable from

a visit last night.  Her CBC unremarkable.  Metabolic panel was within ac

ceptable limits.Abdominal x-ray shows ileus.  Case was discussed with Dr. gillespie 

was went except patient care.  He requests that Gen. surgery be consulted.





EKG interpretation: Ventricular rate 101, sinus tachycardia, IL interval 150, 

QRS 68, . No IL prolongation, no QTC prolongation, no ST or T-wave 

changes noted.  s.  Overall, this EKG is unremarkable











- Related Data


                                Home Medications











 Medication  Instructions  Recorded  Confirmed


 


Carvedilol 25 mg PO BID 12/11/15 09/02/20


 


Cetirizine HCl [Zyrtec] 10 mg PO DAILY 12/11/15 09/02/20


 


Losartan Potassium 100 mg PO DAILY 12/11/15 09/02/20


 


Morphine Sulfate [Morphine Sulfate 30 mg PO DAILY 12/11/15 09/02/20





ER]   


 


Omeprazole [PriLOSEC] 20 mg PO DAILY 12/11/15 09/02/20


 


hydroCHLOROthiazide 25 mg PO DAILY 12/11/15 09/02/20


 


Ipratropium Bromide [Atrovent Hfa] 2 puff INHALATION RT-QID 16


 


Ipratropium-Albuterol Nebulize 3 ml INHALATION RT-QID PRN 18





[Duoneb 0.5 mg-3 mg/3 ml Soln]   


 


oxyCODONE-APAP 5-325MG [Percocet 1 tab PO PC-LUNCH PRN 18





5-325 mg]   


 


Pravastatin Sodium [Pravachol] 40 mg PO HS 20








                                  Previous Rx's











 Medication  Instructions  Recorded


 


Clopidogrel [Plavix] 75 mg PO DAILY #30 tab 12/17/15











                                    Allergies











Allergy/AdvReac Type Severity Reaction Status Date / Time


 


aspirin Allergy  Anaphylaxis Verified 20 14:00


 


NSAIDS (Non-Steroidal Allergy  Anaphylaxis Verified 20 14:00





Anti-Inflamma     


 


Penicillins Allergy  Anaphylaxis Verified 20 14:00


 


Iodinated Contrast Media AdvReac Mild cold Verified 20 14:00





[Iodinated Contrast Media -   symptoms,  





IV Dye]   runny  





   nose,  





   itchy eyes  





   cough  














Review of Systems


ROS Statement: 


Those systems with pertinent positive or pertinent negative responses have been 

documented in the HPI.





ROS Other: All systems not noted in ROS Statement are negative.





Past Medical History


Past Medical History: Asthma, Coronary Artery Disease (CAD), Cancer, COPD, Deep 

Vein Thrombosis (DVT), Fibromyalgia, GERD/Reflux, Hyperlipidemia, Hypertension, 

Osteoarthritis (OA), Respiratory Disorder, Vascular Disorder


Additional Past Medical History / Comment(s): pad,chronic bronchitis, nodules on

thyroid, leukoplakia-lip and vocal cords. past" gangrene on right foot"


History of Any Multi-Drug Resistant Organisms: None Reported


Past Surgical History: Appendectomy,  Section, Cholecystectomy, 

Hysterectomy, Joint Replacement, Orthopedic Surgery


Additional Past Surgical History / Comment(s): leukoplakia removed from lip and 

vocal cords.cervical fusion, rt aorto iliac bypass,aortogram w/ runoff.  2015 had stenting of aorto to rt ext iliac bypass, rt foot 2nd toe 

reconstructive sx, rt carotid endarterectomy, egd/colonoscopy ,cervical 

fusion,lt knee replacment.lt elbow sx,umbilical hernia repair,bilcarpal tunnel 

release,lt and rt breast bx-neg


Past Anesthesia/Blood Transfusion Reactions: Previous Problems w/ Anesthesia, 

Postoperative Nausea & Vomiting (PONV)


Additional Past Anesthesia/Blood Transfusion Reaction / Comment(s): difficulty 

waking from anesthesia


Past Psychological History: Depression


Smoking Status: Current every day smoker


Past Alcohol Use History: None Reported


Past Drug Use History: None Reported





- Past Family History


  ** Mother


Family Medical History: Cancer, Coronary Artery Disease (CAD), Renal Disease


Additional Family Medical History / Comment(s): CABG, LUNG CANCER,





  ** Brother(s)


Family Medical History: Coronary Artery Disease (CAD), CVA/TIA


Additional Family Medical History / Comment(s): 1 BROTHER CABG, 1 BROTHER STROKE





  ** Father


Family Medical History: Coronary Artery Disease (CAD), Renal Disease


Additional Family Medical History / Comment(s): X4 OPEN HEART SX. HUGE CARDIAC 

HX ON DADS SIDE OF FAMILY





General Exam


Limitations: no limitations





Course


                                   Vital Signs











  20





  13:22 14:00


 


Temperature 99.1 F 


 


Pulse Rate 113 H 101 H


 


Respiratory 18 18





Rate  


 


Blood Pressure 178/97 153/81


 


O2 Sat by Pulse 93 L 93 L





Oximetry  














Medical Decision Making





- Lab Data


Result diagrams: 


                                 20 13:43





                                 09/02/20 13:43


                                   Lab Results











  20 Range/Units





  13:43 13:43 


 


WBC  11.8 H   (3.8-10.6)  k/uL


 


RBC  5.22   (3.80-5.40)  m/uL


 


Hgb  15.6   (11.4-16.0)  gm/dL


 


Hct  47.4 H   (34.0-46.0)  %


 


MCV  90.8   (80.0-100.0)  fL


 


MCH  29.9   (25.0-35.0)  pg


 


MCHC  32.9   (31.0-37.0)  g/dL


 


RDW  13.0   (11.5-15.5)  %


 


Plt Count  317   (150-450)  k/uL


 


Neutrophils %  84   %


 


Lymphocytes %  11   %


 


Monocytes %  4   %


 


Eosinophils %  0   %


 


Basophils %  0   %


 


Neutrophils #  10.0 H   (1.3-7.7)  k/uL


 


Lymphocytes #  1.3   (1.0-4.8)  k/uL


 


Monocytes #  0.4   (0-1.0)  k/uL


 


Eosinophils #  0.0   (0-0.7)  k/uL


 


Basophils #  0.0   (0-0.2)  k/uL


 


Sodium   136 L  (137-145)  mmol/L


 


Potassium   4.1  (3.5-5.1)  mmol/L


 


Chloride   101  ()  mmol/L


 


Carbon Dioxide   26  (22-30)  mmol/L


 


Anion Gap   9  mmol/L


 


BUN   19 H  (7-17)  mg/dL


 


Creatinine   0.54  (0.52-1.04)  mg/dL


 


Est GFR (CKD-EPI)AfAm   >90  (>60 ml/min/1.73 sqM)  


 


Est GFR (CKD-EPI)NonAf   >90  (>60 ml/min/1.73 sqM)  


 


Glucose   135 H  (74-99)  mg/dL


 


Calcium   9.9  (8.4-10.2)  mg/dL


 


Total Bilirubin   0.6  (0.2-1.3)  mg/dL


 


AST   25  (14-36)  U/L


 


ALT   13  (4-34)  U/L


 


Alkaline Phosphatase   109  ()  U/L


 


Total Protein   7.1  (6.3-8.2)  g/dL


 


Albumin   4.9  (3.5-5.0)  g/dL


 


Lipase   68  ()  U/L














Disposition


Clinical Impression: 


 Ileus





Disposition: ADMITTED AS IP TO THIS HOSP


Condition: Fair


Referrals: 


Parmjit Roe MD [Primary Care Provider] - 1-2 days


Decision Time: 14:51

## 2020-09-02 NOTE — CT
EXAMINATION TYPE: CT angio thor/abd pel aorta

 

DATE OF EXAM: 9/2/2020

 

COMPARISON: None

 

HISTORY: Chest pain

 

CT DLP: 982.40 mGycm

Automated exposure control for dose reduction was used.

 

CONTRAST: 

Performed with IV Contrast, patient injected with 100 mL of Isovue 370.

Images were obtained from the aortic arch to the floor the pelvis without and with IV contrast and 3-
D post processed images.

Abdominal aorta measures up to 2.4 cm. There is no aneurysm.

 

FINDINGS:

There is normal branching pattern of the great vessels on the aortic arch. Thoracic aorta is atheroma
tous. There is no thoracic aortic dissection. The ascending aorta measures 3.2 cm. Heart size is norm
al. There is no pericardial effusion.

 

There is arterial flow in the celiac artery and superior mesenteric artery which appear widely patent
. There is arterial flow in both renal arteries. There is plaque formation at the origins of both amadou
al arteries. There is aorto iliac bypass graft. The contrast shows bilateral patency of the grafts. I
 see no evidence of hemodynamic stenosis. There is arterial flow in the femoral arteries bilaterally 
in the left and right groin. There is significant plaque formation with occlusion of the lower abdomi
nal aorta.

There are spondylotic changes in the thoracic and lumbar spine. There is mild thoracolumbar dextrosco
liosis. There is T8 and T7 compression fractures up to 50%. Fractures appear new compared to old ches
t CT scan of April 6, 2015.

 

Kidneys have fairly normal size. There is no atrophy. There is no retroperitoneal adenopathy. Visuali
zed liver pancreas stomach biliary tree appear intact. There are clips from cholecystectomy. There is
 some mild distention of loops of small bowel with fluid.

 

IMPRESSION:

No evidence of aortic aneurysm or dissection. There is atherosclerotic significant disease in the low
er abdominal aorta with occlusion of the distal abdominal aorta and patency of the aortofemoral bypas
s graft. No sign of hemodynamic stenosis.

 

There is evidence for some mild small bowel ileus.

## 2020-09-03 VITALS — RESPIRATION RATE: 16 BRPM | DIASTOLIC BLOOD PRESSURE: 59 MMHG | SYSTOLIC BLOOD PRESSURE: 127 MMHG

## 2020-09-03 VITALS — HEART RATE: 96 BPM

## 2020-09-03 VITALS — TEMPERATURE: 98.7 F

## 2020-09-03 LAB
ANION GAP SERPL CALC-SCNC: 5 MMOL/L
BASOPHILS # BLD AUTO: 0 K/UL (ref 0–0.2)
BASOPHILS NFR BLD AUTO: 0 %
BUN SERPL-SCNC: 23 MG/DL (ref 7–17)
CALCIUM SPEC-MCNC: 8.5 MG/DL (ref 8.4–10.2)
CHLORIDE SERPL-SCNC: 108 MMOL/L (ref 98–107)
CO2 SERPL-SCNC: 24 MMOL/L (ref 22–30)
EOSINOPHIL # BLD AUTO: 0 K/UL (ref 0–0.7)
EOSINOPHIL NFR BLD AUTO: 0 %
ERYTHROCYTE [DISTWIDTH] IN BLOOD BY AUTOMATED COUNT: 4.33 M/UL (ref 3.8–5.4)
ERYTHROCYTE [DISTWIDTH] IN BLOOD: 13.1 % (ref 11.5–15.5)
GLUCOSE SERPL-MCNC: 110 MG/DL (ref 74–99)
HCT VFR BLD AUTO: 39.9 % (ref 34–46)
HGB BLD-MCNC: 13 GM/DL (ref 11.4–16)
LYMPHOCYTES # SPEC AUTO: 1.9 K/UL (ref 1–4.8)
LYMPHOCYTES NFR SPEC AUTO: 17 %
MCH RBC QN AUTO: 30 PG (ref 25–35)
MCHC RBC AUTO-ENTMCNC: 32.5 G/DL (ref 31–37)
MCV RBC AUTO: 92.3 FL (ref 80–100)
MONOCYTES # BLD AUTO: 0.6 K/UL (ref 0–1)
MONOCYTES NFR BLD AUTO: 5 %
NEUTROPHILS # BLD AUTO: 8.7 K/UL (ref 1.3–7.7)
NEUTROPHILS NFR BLD AUTO: 76 %
PLATELET # BLD AUTO: 268 K/UL (ref 150–450)
POTASSIUM SERPL-SCNC: 3.6 MMOL/L (ref 3.5–5.1)
SODIUM SERPL-SCNC: 137 MMOL/L (ref 137–145)
WBC # BLD AUTO: 11.4 K/UL (ref 3.8–10.6)

## 2020-09-03 RX ADMIN — IPRATROPIUM BROMIDE AND ALBUTEROL SULFATE PRN ML: .5; 3 SOLUTION RESPIRATORY (INHALATION) at 07:46

## 2020-09-03 RX ADMIN — HEPARIN SODIUM SCH UNIT: 5000 INJECTION, SOLUTION INTRAVENOUS; SUBCUTANEOUS at 08:34

## 2020-09-03 RX ADMIN — CEFAZOLIN SCH: 330 INJECTION, POWDER, FOR SOLUTION INTRAMUSCULAR; INTRAVENOUS at 02:44

## 2020-09-03 RX ADMIN — CEFAZOLIN SCH: 330 INJECTION, POWDER, FOR SOLUTION INTRAMUSCULAR; INTRAVENOUS at 14:59

## 2020-09-03 RX ADMIN — MORPHINE SULFATE PRN MG: 4 INJECTION, SOLUTION INTRAMUSCULAR; INTRAVENOUS at 05:39

## 2020-09-03 NOTE — P.GSCN
History of Present Illness


Consult date: 20


History of present illness: 





CHIEF COMPLAINT: Abdominal pain





HISTORY OF PRESENT ILLNESS: This is a 60-year-old female with a known history of

severe peripheral vascular disease with stenting and aortic iliac bypass, 

carotid endarterectomy, TSH nodule, GERD, hypertension, hyperlipidemia, 

fibromyalgia, herniated disks in her cervical spine.  She is on chronic pain 

medications due to her chronic neck pain and fibromyalgia.  She takes morphine 

and Percocet.  Patient reports a three-day history of abdominal pain.  The pain 

became very severe and sharp.  She did not have a bowel movement for about 3 

days.  3 hours prior to coming into the emergency room she was vomiting.  

Abdominal x-ray had shown possible ileus.  Therefore, surgical consult was 

placed.  Patient is afebrile.  Patient did have a large soft bowel movement 

today.  Abdominal pain has resolved.





PAST MEDICAL HISTORY: 


See list.





PAST SURGICAL HISTORY: 


See list.





MEDICATIONS: 


See list.





ALLERGIES: 


See list.





SOCIAL HISTORY: No illicit drug use.  





REVIEW OF SYSTEMS: 


CONSTITUTIONAL: Denies fever or chills.


HEENT: Denies blurred vision, vision changes, or eye pain. Denies hemoptysis 


CARDIOVASCULAR: Denies chest pain or pressure.


RESPIRATORY: No shortness of breath. 


GASTROINTESTINAL: See HPI for pertinent findings


HEMATOLOGIC: Denies bleeding disorders.


GENITOURINARY:  Denies any blood in urine or increased urinary frequency.  


SKIN: Denies pruitis. Denies rash.





PHYSICAL EXAM: 


VITAL SIGNS: Reviewed


GENERAL: Well-developed in no acute distress. 


HEENT:  No sclera icterus. Extraocular movements grossly intact.  Moist buccal 

mucosa. Head is atraumatic, normocephalic. No nasal drainage.


ABDOMEN:  Soft.  Nontender nondistended


NEUROLOGIC:  Alert and oriented.  Cranial nerves II through XII grossly intact.





LABORATORY DATA:


WBC 11.8 LFTs normal lipase normal





IMAGING:


Abdominal x-ray showing ileus, enteritis





ASSESSMENT: 


1.  Abdominal pain likely secondary to ileus versus a gastroenteritis. Now 

resolved





PLAN: 


-Start patient on a clear liquid diet


-Encourage patient to ambulate


-Continue with IV fluids





Thank you for this consultation.








Physician Assistant note has been reviewed by physician. Signing provider agrees

with the documented findings, assessment, and plan of care. 





Past Medical History


Past Medical History: Asthma, Coronary Artery Disease (CAD), Cancer, COPD, Deep 

Vein Thrombosis (DVT), Fibromyalgia, GERD/Reflux, Hyperlipidemia, Hypertension, 

Osteoarthritis (OA), Respiratory Disorder, Vascular Disorder


Additional Past Medical History / Comment(s): pad,chronic bronchitis, nodules on

thyroid, leukoplakia-lip and vocal cords. past" gangrene on right foot"


History of Any Multi-Drug Resistant Organisms: None Reported


Past Surgical History: Appendectomy,  Section, Cholecystectomy, 

Hysterectomy, Joint Replacement, Orthopedic Surgery


Additional Past Surgical History / Comment(s): leukoplakia removed from lip and 

vocal cords.cervical fusion, rt aorto iliac bypass,aortogram w/ runoff.  2015 had stenting of aorto to rt ext iliac bypass, rt foot 2nd toe 

reconstructive sx, rt carotid endarterectomy, egd/colonoscopy ,cervical 

fusion,lt knee replacment.lt elbow sx,umbilical hernia repair,bilcarpal tunnel 

release,lt and rt breast bx-neg


Past Anesthesia/Blood Transfusion Reactions: Previous Problems w/ Anesthesia, 

Postoperative Nausea & Vomiting (PONV)


Additional Past Anesthesia/Blood Transfusion Reaction / Comm: difficulty waking 

from anesthesia


Past Psychological History: Depression


Smoking Status: Current every day smoker


Past Alcohol Use History: None Reported


Past Drug Use History: None Reported





- Past Family History


  ** Mother


Family Medical History: Cancer, Coronary Artery Disease (CAD), Renal Disease


Additional Family Medical History / Comment(s): CABG, LUNG CANCER,





  ** Brother(s)


Family Medical History: Coronary Artery Disease (CAD), CVA/TIA


Additional Family Medical History / Comment(s): 1 BROTHER CABG, 1 BROTHER STROKE





  ** Father


Family Medical History: Coronary Artery Disease (CAD), Renal Disease


Additional Family Medical History / Comment(s): X4 OPEN HEART SX. HUGE CARDIAC 

HX ON DADS SIDE OF FAMILY





Medications and Allergies


                                Home Medications











 Medication  Instructions  Recorded  Confirmed  Type


 


Carvedilol 25 mg PO BID 12/11/15 09/02/20 History


 


Cetirizine HCl [Zyrtec] 10 mg PO DAILY 12/11/15 09/02/20 History


 


Losartan Potassium 100 mg PO DAILY 12/11/15 09/02/20 History


 


Morphine Sulfate [Morphine Sulfate 30 mg PO DAILY 12/11/15 09/02/20 History





ER]    


 


Omeprazole [PriLOSEC] 20 mg PO DAILY 12/11/15 09/02/20 History


 


hydroCHLOROthiazide 25 mg PO DAILY 12/11/15 09/02/20 History


 


Clopidogrel [Plavix] 75 mg PO DAILY #30 tab 12/17/15 09/02/20 Rx


 


Ipratropium Bromide [Atrovent Hfa] 2 puff INHALATION RT-QID 16 

History


 


Ipratropium-Albuterol Nebulize 3 ml INHALATION RT-QID PRN 18 

History





[Duoneb 0.5 mg-3 mg/3 ml Soln]    


 


oxyCODONE-APAP 5-325MG [Percocet 1 tab PO PC-LUNCH PRN 18 History





5-325 mg]    


 


Pravastatin Sodium [Pravachol] 40 mg PO HS 20 History








                                    Allergies











Allergy/AdvReac Type Severity Reaction Status Date / Time


 


aspirin Allergy  Anaphylaxis Verified 20 14:00


 


NSAIDS (Non-Steroidal Allergy  Anaphylaxis Verified 20 14:00





Anti-Inflamma     


 


Penicillins Allergy  Anaphylaxis Verified 20 14:00


 


Iodinated Contrast Media AdvReac Mild cold Verified 20 14:00





[Iodinated Contrast Media -   symptoms,  





IV Dye]   runny  





   nose,  





   itchy eyes  





   cough  














Surgical - Exam


                                   Vital Signs











Temp Pulse Resp BP Pulse Ox


 


 99.1 F   113 H  18   178/97   93 L


 


 20 13:22  20 13:22  20 13:22  20 13:22  20 13:22














Results





- Labs





                                 20 06:42





                                 20 06:42


                  Abnormal Lab Results - Last 24 Hours (Table)











  20 Range/Units





  13:43 13:43 06:42 


 


WBC  11.8 H   11.4 H  (3.8-10.6)  k/uL


 


Hct  47.4 H    (34.0-46.0)  %


 


Neutrophils #  10.0 H   8.7 H  (1.3-7.7)  k/uL


 


Sodium   136 L   (137-145)  mmol/L


 


Chloride     ()  mmol/L


 


BUN   19 H   (7-17)  mg/dL


 


Glucose   135 H   (74-99)  mg/dL














  20 Range/Units





  06:42 


 


WBC   (3.8-10.6)  k/uL


 


Hct   (34.0-46.0)  %


 


Neutrophils #   (1.3-7.7)  k/uL


 


Sodium   (137-145)  mmol/L


 


Chloride  108 H  ()  mmol/L


 


BUN  23 H  (7-17)  mg/dL


 


Glucose  110 H  (74-99)  mg/dL








                                 Diabetes panel











  20 Range/Units





  13:43 06:42 


 


Sodium  136 L  137  (137-145)  mmol/L


 


Potassium  4.1  3.6  (3.5-5.1)  mmol/L


 


Chloride  101  108 H  ()  mmol/L


 


Carbon Dioxide  26  24  (22-30)  mmol/L


 


BUN  19 H  23 H  (7-17)  mg/dL


 


Creatinine  0.54  0.60  (0.52-1.04)  mg/dL


 


Glucose  135 H  110 H  (74-99)  mg/dL


 


Calcium  9.9  8.5  (8.4-10.2)  mg/dL


 


AST  25   (14-36)  U/L


 


ALT  13   (4-34)  U/L


 


Alkaline Phosphatase  109   ()  U/L


 


Total Protein  7.1   (6.3-8.2)  g/dL


 


Albumin  4.9   (3.5-5.0)  g/dL








                                  Calcium panel











  20 Range/Units





  13:43 06:42 


 


Calcium  9.9  8.5  (8.4-10.2)  mg/dL


 


Albumin  4.9   (3.5-5.0)  g/dL








                                 Pituitary panel











  20 Range/Units





  13:43 06:42 


 


Sodium  136 L  137  (137-145)  mmol/L


 


Potassium  4.1  3.6  (3.5-5.1)  mmol/L


 


Chloride  101  108 H  ()  mmol/L


 


Carbon Dioxide  26  24  (22-30)  mmol/L


 


BUN  19 H  23 H  (7-17)  mg/dL


 


Creatinine  0.54  0.60  (0.52-1.04)  mg/dL


 


Glucose  135 H  110 H  (74-99)  mg/dL


 


Calcium  9.9  8.5  (8.4-10.2)  mg/dL








                                  Adrenal panel











  20 Range/Units





  13:43 06:42 


 


Sodium  136 L  137  (137-145)  mmol/L


 


Potassium  4.1  3.6  (3.5-5.1)  mmol/L


 


Chloride  101  108 H  ()  mmol/L


 


Carbon Dioxide  26  24  (22-30)  mmol/L


 


BUN  19 H  23 H  (7-17)  mg/dL


 


Creatinine  0.54  0.60  (0.52-1.04)  mg/dL


 


Glucose  135 H  110 H  (74-99)  mg/dL


 


Calcium  9.9  8.5  (8.4-10.2)  mg/dL


 


Total Bilirubin  0.6   (0.2-1.3)  mg/dL


 


AST  25   (14-36)  U/L


 


ALT  13   (4-34)  U/L


 


Alkaline Phosphatase  109   ()  U/L


 


Total Protein  7.1   (6.3-8.2)  g/dL


 


Albumin  4.9   (3.5-5.0)  g/dL

## 2020-09-03 NOTE — P.DS
Providers


Date of admission: 


09/02/20 14:22





Expected date of discharge: 09/03/20


Attending physician: 


Karsten Gutiérrez MD





Consults: 





                                        





09/02/20 14:21


Consult Physician Routine 


   Consulting Provider: Jovany Anderson


   Consult Reason/Comments: ileus


   Do you want consulting provider notified?: Yes











Primary care physician: 


Parmjit Roe





[facility] Course: 





Final diagnosis





Abdominal pain secondary to ileus


Chronic pain patient is currently on Percocet 10 morphine p.o. at home


Peripheral vascular disease with history of aortoiliac bypass and also stent 

placement


History of carotid endarterectomy


COPD not exacerbation


Currently everyday smoker


Depression


Fibromyalgia


Hypertension


Hyperlipidemia


GERD


Osteoarthritis


History of leukoplakia removed from lip and vocal cords.


DVT prophylaxis








Discharge disposition


Patient is being discharged in a stable condition with guarded prognosis to 

home.  Patient will follow-up with Dr. Roe in the outpatient setting upon 

discharge.  Patient also instructed to continue with clear liquids to full 

liquid diet for the next few days and slowly advance as tolerated.  Total time 

taken is greater than 35 minutes.





History of present illness 


This is a 60-year-old female who was recently admitted with abdominal pain that 

had become more severe over the last 5 days along with multiple episodes of 

vomiting and was being closely monitored.  Patient was seen and evaluated by 

surgery as x-ray showed possible ileus although patient had a bowel movement 

today and is feeling much better.  Patient was initiated on clear liquids and 

tolerated with no reports of abdominal pain noted.  Patient has not had any 

vomiting noted as well.  Patient does take chronic pain medications for 

fibromyalgia and will be following up with primary care provider in the 

outpatient setting.  Instructed the patient to continue with clear liquids to 

full liquids and advance slowly over the next few days as tolerated.  Patient 

had a bowel movement today and is feeling much better and would really like to 

go home today. Currently no reports of chest pain, shortness of breath, or 

palpitations.  Patient is afebrile.  No reports of nausea or vomiting and 

patient is tolerating diet.  Patient will be discharged home today.





On exam vital signs are stable.  Temp is 98.7F, pulse is 89, respirations are 

16, blood pressure is 127/59, oxygen saturation is 90-92% on room air.  Cardio 

S1, S2 are muffled.  Respiratory system shows diminished breath sounds at the 

bases with no wheezing or rhonchi noted.  Abdomen is soft and nontender.  

Nervous system shows no focal deficits.





Please refer to medication reconciliation sheet for a list of medications.


Patient Condition at Discharge: Fair





Plan - Discharge Summary


New Discharge Prescriptions: 


Continue


   Cetirizine HCl [Zyrtec] 10 mg PO DAILY


   Omeprazole [PriLOSEC] 20 mg PO DAILY


   Morphine Sulfate [Morphine Sulfate ER] 30 mg PO DAILY


   hydroCHLOROthiazide 25 mg PO DAILY


   Carvedilol 25 mg PO BID


   Losartan Potassium 100 mg PO DAILY


   Clopidogrel [Plavix] 75 mg PO DAILY #30 tab


   Ipratropium Bromide [Atrovent Hfa] 2 puff INHALATION RT-QID


   Ipratropium-Albuterol Nebulize [Duoneb 0.5 mg-3 mg/3 ml Soln] 3 ml INHALATION

RT-QID PRN


     PRN Reason: sob


   oxyCODONE-APAP 5-325MG [Percocet 5-325 mg] 1 tab PO PC-LUNCH PRN


     PRN Reason: Pain


   Pravastatin Sodium [Pravachol] 40 mg PO HS


Discharge Medication List





Carvedilol 25 mg PO BID 12/11/15 [History]


Cetirizine HCl [Zyrtec] 10 mg PO DAILY 12/11/15 [History]


Losartan Potassium 100 mg PO DAILY 12/11/15 [History]


Morphine Sulfate [Morphine Sulfate ER] 30 mg PO DAILY 12/11/15 [History]


Omeprazole [PriLOSEC] 20 mg PO DAILY 12/11/15 [History]


hydroCHLOROthiazide 25 mg PO DAILY 12/11/15 [History]


Clopidogrel [Plavix] 75 mg PO DAILY #30 tab 12/17/15 [Rx]


Ipratropium Bromide [Atrovent Hfa] 2 puff INHALATION RT-QID 05/09/16 [History]


Ipratropium-Albuterol Nebulize [Duoneb 0.5 mg-3 mg/3 ml Soln] 3 ml INHALATION 

RT-QID PRN 02/14/18 [History]


oxyCODONE-APAP 5-325MG [Percocet 5-325 mg] 1 tab PO PC-LUNCH PRN 02/14/18 

[History]


Pravastatin Sodium [Pravachol] 40 mg PO HS 09/02/20 [History]








Follow up Appointment(s)/Referral(s): 


Parmjit Roe MD [Primary Care Provider] - 09/08/20 2:10 pm


Patient Instructions/Handouts:  How to Stop Smoking (DC), Ileus (DC)


Activity/Diet/Wound Care/Special Instructions: 


Activity Limited until follow-up


Follow-up with primary care provider upon discharge


Continue current diet of clear to full liquids for the next few days and slowly 

advance as tolerated





Discharge Disposition: HOME SELF-CARE

## 2021-03-02 ENCOUNTER — HOSPITAL ENCOUNTER (OUTPATIENT)
Dept: HOSPITAL 47 - ORWHC2ENDO | Age: 61
Discharge: HOME | End: 2021-03-02
Attending: INTERNAL MEDICINE
Payer: COMMERCIAL

## 2021-03-02 VITALS — SYSTOLIC BLOOD PRESSURE: 150 MMHG | DIASTOLIC BLOOD PRESSURE: 72 MMHG | HEART RATE: 83 BPM

## 2021-03-02 VITALS — TEMPERATURE: 97.6 F | RESPIRATION RATE: 16 BRPM

## 2021-03-02 VITALS — BODY MASS INDEX: 24 KG/M2

## 2021-03-02 DIAGNOSIS — D12.4: ICD-10-CM

## 2021-03-02 DIAGNOSIS — D12.0: Primary | ICD-10-CM

## 2021-03-02 DIAGNOSIS — K21.9: ICD-10-CM

## 2021-03-02 DIAGNOSIS — Z91.041: ICD-10-CM

## 2021-03-02 DIAGNOSIS — Z79.891: ICD-10-CM

## 2021-03-02 DIAGNOSIS — Z97.2: ICD-10-CM

## 2021-03-02 DIAGNOSIS — K29.50: ICD-10-CM

## 2021-03-02 DIAGNOSIS — Z88.6: ICD-10-CM

## 2021-03-02 DIAGNOSIS — Z98.890: ICD-10-CM

## 2021-03-02 DIAGNOSIS — J44.9: ICD-10-CM

## 2021-03-02 DIAGNOSIS — D12.5: ICD-10-CM

## 2021-03-02 DIAGNOSIS — F17.210: ICD-10-CM

## 2021-03-02 DIAGNOSIS — Z79.02: ICD-10-CM

## 2021-03-02 DIAGNOSIS — K62.1: ICD-10-CM

## 2021-03-02 DIAGNOSIS — Z88.0: ICD-10-CM

## 2021-03-02 DIAGNOSIS — Z90.49: ICD-10-CM

## 2021-03-02 DIAGNOSIS — K44.9: ICD-10-CM

## 2021-03-02 DIAGNOSIS — Z90.89: ICD-10-CM

## 2021-03-02 DIAGNOSIS — Q43.8: ICD-10-CM

## 2021-03-02 DIAGNOSIS — Z72.0: ICD-10-CM

## 2021-03-02 DIAGNOSIS — Z79.899: ICD-10-CM

## 2021-03-02 DIAGNOSIS — Z88.1: ICD-10-CM

## 2021-03-02 DIAGNOSIS — G89.29: ICD-10-CM

## 2021-03-02 DIAGNOSIS — Z96.652: ICD-10-CM

## 2021-03-02 DIAGNOSIS — Z90.710: ICD-10-CM

## 2021-03-02 DIAGNOSIS — Z98.49: ICD-10-CM

## 2021-03-02 PROCEDURE — 88305 TISSUE EXAM BY PATHOLOGIST: CPT

## 2021-03-02 PROCEDURE — 45385 COLONOSCOPY W/LESION REMOVAL: CPT

## 2021-03-02 PROCEDURE — 43239 EGD BIOPSY SINGLE/MULTIPLE: CPT

## 2021-03-02 PROCEDURE — 45380 COLONOSCOPY AND BIOPSY: CPT

## 2021-03-02 NOTE — P.PCN
Date of Procedure: 03/02/21


Description of Procedure: 








Brief history:


Patient is a pleasant 61-year-old female presenting for outpatient EGD and 

colonoscopy for evaluation of GERD and altered bowel function.  Patient has 

uncontrolled reflux.  She reports alternating constipation and diarrhea.





Procedure performed:


Esophagogastroduodenoscopy with biopsy


Colonoscopy with polypectomy and biopsy





Estimated blood loss:


Minimal.





Preoperative diagnosis:


GERD, altered bowel function





Anesthesia: 


MAC





Procedure:


After informed consent was obtained from the patient  was brought into the 

endoscopy unit and IV  sedation was administered by anesthesia under continuous 

monitoring.  Initially upper endoscopy was done.  The Olympus  video 

endoscope was inserted into the mouth and esophagus intubated without any 

difficulty and was gradually advanced into the stomach and duodenum and 

carefully examined.  The bulb and second part of the duodenum appeared normal, 

with biopsies taken to rule out celiac sprue.  The scope was then withdrawn into

the stomach adequately insufflated with air and upon careful examination  the 

antrum and body, cardia and fundus appeared normal, except for moderate 

scattered erythema in the antrum and body suggestive of moderate gastritis 

biopsies taken.  The scope was then withdrawn into the esophagus.  The GE 

junction was located at 33 cm to the incisor and biopsied.  2 cm hiatal hernia 

noted .  It appeared regular with no erythema erosions or ulcerations.  Rest of 

the esophagus appeared normal.  Patient tolerated the procedure well.





At this time the patient continued to remain sedation.  Initial digital rectal 

examination was normal.  Olympus  video colonoscope was then inserted into

the rectum and gradually advanced to the cecum without any difficulty.  Careful 

examination was performed as the scope was gradually being withdrawn.  The prep 

was excellent.  The cecum, ascending colon, transverse colon, descending colon, 

sigmoid colon and rectum appeared normal,  But was somewhat tortuous.  2 flat 

polyps measuring 5 and 6 mm removed from the cecum and ascending colon with cold

snare polypectomy.  2 diminutive polyps measuring 1 mm in size removed from the 

sigmoid colon and rectum with cold forcep polypectomy.  Random biopsies taken of

the right and left colon.  Retroflexion was performed in the rectum and no 

lesions were noted.  Patient tolerated the procedure well.





Impression:


1.  Moderate gastritis.  Small hiatal hernia.  Biopsies of the duodenum, antrum 

body and GE junction.  


2.  2 flat polyps removed with cold snare polypectomy from the cecum and 

ascending colon.  2 diminutive polyps removed with cold forcep polypectomy from 

the sigmoid and rectum.  Random biopsies taken of the right and left colon in 

the setting of altered bowel function.








Recommendations:


Findings of this examination were discussed with the patient as well as  her fa

jaja.  Okay to resume diet.  Okay to resume medications.  Await pathology from 

polypectomies. follow up in GI clinic as scheduled.  Recommend repeat 

colonoscopy in 5 years for colon polyps pending pathology from polypectomies.

## 2021-06-07 ENCOUNTER — HOSPITAL ENCOUNTER (OUTPATIENT)
Dept: HOSPITAL 47 - RADXRMAIN | Age: 61
Discharge: HOME | End: 2021-06-07
Payer: COMMERCIAL

## 2021-06-07 DIAGNOSIS — J44.9: Primary | ICD-10-CM

## 2021-06-07 PROCEDURE — 71046 X-RAY EXAM CHEST 2 VIEWS: CPT

## 2021-06-07 NOTE — XR
EXAMINATION TYPE: XR chest 2V

 

DATE OF EXAM: 6/7/2021

 

COMPARISON: Chest x-ray September 5, 2019

 

HISTORY: History of COPD with congestion.

 

TECHNIQUE:  Frontal and lateral views of the chest are obtained.

 

FINDINGS:  There are chronic parenchymal changes without suspicious new focal air space opacity, pleu
ral effusion, or pneumothorax seen.  The cardiac silhouette size is stable and within normal limits. 
  The osseous structures are demineralized. Anterior fusion plate lower cervical spine redemonstrated
. Cholecystectomy clips redemonstrated.

 

IMPRESSION:  Chronic changes without acute pulmonary process. No significant change from prior.

## 2021-09-21 ENCOUNTER — HOSPITAL ENCOUNTER (INPATIENT)
Dept: HOSPITAL 47 - EC | Age: 61
LOS: 2 days | Discharge: HOME | DRG: 65 | End: 2021-09-23
Payer: COMMERCIAL

## 2021-09-21 DIAGNOSIS — Z82.3: ICD-10-CM

## 2021-09-21 DIAGNOSIS — G43.909: ICD-10-CM

## 2021-09-21 DIAGNOSIS — Z91.041: ICD-10-CM

## 2021-09-21 DIAGNOSIS — I10: ICD-10-CM

## 2021-09-21 DIAGNOSIS — M19.90: ICD-10-CM

## 2021-09-21 DIAGNOSIS — Z96.652: ICD-10-CM

## 2021-09-21 DIAGNOSIS — E78.5: ICD-10-CM

## 2021-09-21 DIAGNOSIS — M31.6: ICD-10-CM

## 2021-09-21 DIAGNOSIS — F17.200: ICD-10-CM

## 2021-09-21 DIAGNOSIS — E53.8: ICD-10-CM

## 2021-09-21 DIAGNOSIS — I25.10: ICD-10-CM

## 2021-09-21 DIAGNOSIS — Z20.822: ICD-10-CM

## 2021-09-21 DIAGNOSIS — Z82.49: ICD-10-CM

## 2021-09-21 DIAGNOSIS — R20.0: ICD-10-CM

## 2021-09-21 DIAGNOSIS — Z79.82: ICD-10-CM

## 2021-09-21 DIAGNOSIS — K21.9: ICD-10-CM

## 2021-09-21 DIAGNOSIS — Z79.02: ICD-10-CM

## 2021-09-21 DIAGNOSIS — I73.9: ICD-10-CM

## 2021-09-21 DIAGNOSIS — I63.9: Primary | ICD-10-CM

## 2021-09-21 DIAGNOSIS — R29.702: ICD-10-CM

## 2021-09-21 DIAGNOSIS — I65.23: ICD-10-CM

## 2021-09-21 DIAGNOSIS — Z85.828: ICD-10-CM

## 2021-09-21 DIAGNOSIS — G89.29: ICD-10-CM

## 2021-09-21 DIAGNOSIS — Z80.1: ICD-10-CM

## 2021-09-21 DIAGNOSIS — Z86.718: ICD-10-CM

## 2021-09-21 DIAGNOSIS — M79.7: ICD-10-CM

## 2021-09-21 DIAGNOSIS — H33.21: ICD-10-CM

## 2021-09-21 DIAGNOSIS — Z79.899: ICD-10-CM

## 2021-09-21 DIAGNOSIS — J44.9: ICD-10-CM

## 2021-09-21 DIAGNOSIS — Z90.711: ICD-10-CM

## 2021-09-21 LAB
ALBUMIN SERPL-MCNC: 4.1 G/DL (ref 3.5–5)
ALP SERPL-CCNC: 94 U/L (ref 38–126)
ALT SERPL-CCNC: 10 U/L (ref 4–34)
ANION GAP SERPL CALC-SCNC: 9 MMOL/L
APTT BLD: 22.3 SEC (ref 22–30)
AST SERPL-CCNC: 20 U/L (ref 14–36)
BASOPHILS # BLD AUTO: 0.1 K/UL (ref 0–0.2)
BASOPHILS NFR BLD AUTO: 1 %
BUN SERPL-SCNC: 13 MG/DL (ref 7–17)
CALCIUM SPEC-MCNC: 9.5 MG/DL (ref 8.4–10.2)
CHLORIDE SERPL-SCNC: 100 MMOL/L (ref 98–107)
CO2 SERPL-SCNC: 27 MMOL/L (ref 22–30)
EOSINOPHIL # BLD AUTO: 0.1 K/UL (ref 0–0.7)
EOSINOPHIL NFR BLD AUTO: 2 %
ERYTHROCYTE [DISTWIDTH] IN BLOOD BY AUTOMATED COUNT: 4.21 M/UL (ref 3.8–5.4)
ERYTHROCYTE [DISTWIDTH] IN BLOOD: 12.7 % (ref 11.5–15.5)
GLUCOSE BLD-MCNC: 99 MG/DL (ref 75–99)
GLUCOSE SERPL-MCNC: 94 MG/DL (ref 74–99)
HCT VFR BLD AUTO: 39 % (ref 34–46)
HGB BLD-MCNC: 13 GM/DL (ref 11.4–16)
INR PPP: 0.9 (ref ?–1.2)
LYMPHOCYTES # SPEC AUTO: 2.6 K/UL (ref 1–4.8)
LYMPHOCYTES NFR SPEC AUTO: 31 %
MAGNESIUM SPEC-SCNC: 2.1 MG/DL (ref 1.6–2.3)
MCH RBC QN AUTO: 31 PG (ref 25–35)
MCHC RBC AUTO-ENTMCNC: 33.4 G/DL (ref 31–37)
MCV RBC AUTO: 92.8 FL (ref 80–100)
MONOCYTES # BLD AUTO: 0.4 K/UL (ref 0–1)
MONOCYTES NFR BLD AUTO: 5 %
NEUTROPHILS # BLD AUTO: 5.1 K/UL (ref 1.3–7.7)
NEUTROPHILS NFR BLD AUTO: 60 %
PLATELET # BLD AUTO: 265 K/UL (ref 150–450)
POTASSIUM SERPL-SCNC: 3.3 MMOL/L (ref 3.5–5.1)
PROT SERPL-MCNC: 6.5 G/DL (ref 6.3–8.2)
PT BLD: 9.6 SEC (ref 9–12)
SODIUM SERPL-SCNC: 136 MMOL/L (ref 137–145)
T4 FREE SERPL-MCNC: 1.09 NG/DL (ref 0.78–2.19)
WBC # BLD AUTO: 8.6 K/UL (ref 3.8–10.6)

## 2021-09-21 PROCEDURE — 84443 ASSAY THYROID STIM HORMONE: CPT

## 2021-09-21 PROCEDURE — 80061 LIPID PANEL: CPT

## 2021-09-21 PROCEDURE — 70553 MRI BRAIN STEM W/O & W/DYE: CPT

## 2021-09-21 PROCEDURE — 82607 VITAMIN B-12: CPT

## 2021-09-21 PROCEDURE — 86140 C-REACTIVE PROTEIN: CPT

## 2021-09-21 PROCEDURE — 93306 TTE W/DOPPLER COMPLETE: CPT

## 2021-09-21 PROCEDURE — 70496 CT ANGIOGRAPHY HEAD: CPT

## 2021-09-21 PROCEDURE — 84484 ASSAY OF TROPONIN QUANT: CPT

## 2021-09-21 PROCEDURE — 85730 THROMBOPLASTIN TIME PARTIAL: CPT

## 2021-09-21 PROCEDURE — 71045 X-RAY EXAM CHEST 1 VIEW: CPT

## 2021-09-21 PROCEDURE — 36415 COLL VENOUS BLD VENIPUNCTURE: CPT

## 2021-09-21 PROCEDURE — 80048 BASIC METABOLIC PNL TOTAL CA: CPT

## 2021-09-21 PROCEDURE — 85610 PROTHROMBIN TIME: CPT

## 2021-09-21 PROCEDURE — 93880 EXTRACRANIAL BILAT STUDY: CPT

## 2021-09-21 PROCEDURE — 84439 ASSAY OF FREE THYROXINE: CPT

## 2021-09-21 PROCEDURE — 94640 AIRWAY INHALATION TREATMENT: CPT

## 2021-09-21 PROCEDURE — 80053 COMPREHEN METABOLIC PANEL: CPT

## 2021-09-21 PROCEDURE — 99291 CRITICAL CARE FIRST HOUR: CPT

## 2021-09-21 PROCEDURE — 93005 ELECTROCARDIOGRAM TRACING: CPT

## 2021-09-21 PROCEDURE — 70498 CT ANGIOGRAPHY NECK: CPT

## 2021-09-21 PROCEDURE — 85025 COMPLETE CBC W/AUTO DIFF WBC: CPT

## 2021-09-21 PROCEDURE — 70450 CT HEAD/BRAIN W/O DYE: CPT

## 2021-09-21 PROCEDURE — 85652 RBC SED RATE AUTOMATED: CPT

## 2021-09-21 PROCEDURE — 84481 FREE ASSAY (FT-3): CPT

## 2021-09-21 PROCEDURE — 96375 TX/PRO/DX INJ NEW DRUG ADDON: CPT

## 2021-09-21 PROCEDURE — 87635 SARS-COV-2 COVID-19 AMP PRB: CPT

## 2021-09-21 PROCEDURE — 82746 ASSAY OF FOLIC ACID SERUM: CPT

## 2021-09-21 PROCEDURE — 96374 THER/PROPH/DIAG INJ IV PUSH: CPT

## 2021-09-21 PROCEDURE — 83735 ASSAY OF MAGNESIUM: CPT

## 2021-09-21 RX ADMIN — POTASSIUM CHLORIDE SCH MLS/HR: 7.46 INJECTION, SOLUTION INTRAVENOUS at 23:15

## 2021-09-21 RX ADMIN — POTASSIUM CHLORIDE SCH: 7.46 INJECTION, SOLUTION INTRAVENOUS at 23:15

## 2021-09-21 RX ADMIN — POTASSIUM CHLORIDE SCH MLS/HR: 7.46 INJECTION, SOLUTION INTRAVENOUS at 23:13

## 2021-09-21 RX ADMIN — CEFAZOLIN SCH MLS/HR: 330 INJECTION, POWDER, FOR SOLUTION INTRAMUSCULAR; INTRAVENOUS at 23:12

## 2021-09-21 NOTE — XR
EXAMINATION TYPE: XR chest 1V portable

 

DATE OF EXAM: 9/21/2021

 

COMPARISON: 6/7/2021

 

HISTORY: Altered mental status

 

TECHNIQUE: Single frontal view of the chest is obtained.

 

FINDINGS:  There is no focal air space opacity, pleural effusion, or pneumothorax seen.  The cardiac 
silhouette size is within normal limits.   The osseous structures are stable. Cervical spine fusion s
een.

 

IMPRESSION:  No acute process.

## 2021-09-21 NOTE — CT
EXAM: CT brain wo con for TPA

CLINICAL HISTORY: Suspected stroke.

COMPARISON: None

TECHNIQUE: Contiguous axial noncontrast images of the brain were obtained. Coronal and sagittal refor
mats were generated and reviewed. Automated dose control was used for this exam.

 

FINDINGS:

 

There is no evidence for intracranial hemorrhage, mass effect or midline shift. White matter is gross
ly preserved.

Ventricular size and configuration is within normal limits for degree of parenchymal volume.

The paranasal sinuses are clear. The mastoid air cells are clear.

No evidence for calvarial fracture.

 

IMPRESSION:

No acute intracranial abnormality.

## 2021-09-21 NOTE — ED
General Adult HPI





- General


Chief complaint: Neuro Symptoms/Deficit


Stated complaint: L Arm Numbness


Time Seen by Provider: 21 20:36


Source: patient


Mode of arrival: ambulatory


Limitations: no limitations





- History of Present Illness


Initial comments: 


Dictation was produced using dragon dictation software. please excuse any 

grammatical, word or spelling errors. 











Chief Complaint: 61-year-old female presents to the emergency department for 

strokelike symptoms starting at 7:25 PM





History of Present Illness: Patient is a 61-year-old female she is currently 

undergoing workup for possible temporal arteritis.  At 725 she was getting ready

to lay down when all of a sudden she began feeling weakness in her left upper 

extremity and paresthesias to her right lower extremity.  Patient told triage 

that she had some right facial numbness and numbness yesterday.  She denies any 

of those symptoms today.  Patient denies any headache.  Patient denies any 

history of stroke.  She does not take any anticoagulation medications.  She does

take Plavix.  Patient states she hasn't taken her Plavix last couple days.





The ROS documented in this emergency department record has been reviewed and 

confirmed by me.  Those systems with pertinent positive or negative responses 

have been documented in the HPI.  All other systems are other negative and/or 

noncontributory.








PHYSICAL EXAM:


General Impression: Alert and oriented x3, not in acute distress


HEENT: Normocephalic atraumatic, extra-ocular movements intact, pupils equal and

reactive to light bilaterally, mucous membranes moist.


Cardiovascular: Heart regular rate and rhythm


Chest: Able to complete full sentences, no retractions, no tachypnea


Abdomen: abdomen soft, non-tender, non-distended, no organomegaly


Musculoskeletal: Pulses present and equal in all extremities, no peripheral 

edema


Motor:  no focal deficits noted


Neurological: CN II-XII grossly intact, left upper extremity drift, right lower 

extremity sensory paresthesias to light touch.  NIH score of 2.


Skin: Intact with no visualized rashes


Psych: Normal affect and mood





ED course: 61-year-old female presents with strokelike symptoms starting at 7:25

PM.  Patient is NIH score of 2.  Patient has low NIH score.  She is not a good 

candidate for TPA administration due to risk outweighing the benefits.  Vital 

signs upon arrival are within acceptable limits.  Patient reports that she has 

an iodine ALLERGY.  She states that her reaction is she feels cold when given 

iodine.  Denies any throat swelling lightheadedness trouble breathing when she 

gets iodine.  Code stroke paged.





CT angioma of the head and neck was obtained showing no acute abnormality.  

There was however identification of severe right and moderate to severe left 

proximal ICA stenosis.  Patient reevaluated at bedside at 10:30 PM found to be 

stable medical condition.  Patient does not have any drift of the upper 

extremities.  She complains of paresthesias to the right foot.  However she 

still able to feel light touch.  Case is discussed with the stroke neurologist 

who recommends medical management.  Patient given aspirin.  Patient be admitted 

with neurology on consultation.  Patient reevaluated at bedside at 10:30 PM.  

Patient does not have any left upper extremity injury.  She states that she does

still have some paresthesias to the right foot but now she has some mild pain 

there.  She is not aphasic.  Non-dysarthric.  Repeat NIH score of 1 for 

paresthesias to the right lower extremity. 





EKG interpretation: Ventricular rate 86, normal sinus rhythm,.  146, QRS 76, QTC

466. No SC prolongation, no QTC prolongation, no ST or T-wave changes noted.    

Overall, this EKG is unremarkable








- Related Data


                                Home Medications











 Medication  Instructions  Recorded  Confirmed


 


Carvedilol 25 mg PO BID 12/11/15 09/21/21


 


Losartan Potassium 100 mg PO DAILY 12/11/15 09/21/21


 


hydroCHLOROthiazide 25 mg PO DAILY 12/11/15 09/21/21


 


oxyCODONE-APAP 5-325MG [Percocet 1 tab PO PC-LUNCH PRN 18





5-325 mg]   


 


Pravastatin Sodium [Pravachol] 40 mg PO HS 20


 


Fluticasone Nasal Spray [Flonase 2 spr EA NOSTRIL DAILY 21





Nasal Spray]   


 


Ondansetron [Zofran] 4 mg PO Q8HR PRN 21


 


diphenhydrAMINE [Benadryl] 25 mg PO DAILY PRN 21


 


hydrALAZINE HCL [Apresoline] 25 mg PO TID 21


 


Albuterol Sulfate [Albuterol 2 puff PO RT-Q6H PRN 21





Sulfate Hfa]   


 


Baclofen 10 mg PO TID PRN 21


 


Bio Complete 3 1 tab PO BID 21


 


Dicyclomine HCl 20 mg PO TID 21


 


Ipratropium Bromide [Atrovent Hfa] 2 puff INHALATION RT-QID 21


 


Ipratropium-Albuterol Nebulize 3 ml INHALATION RT-BID 21





[Duoneb 0.5 mg-3 mg/3 ml Soln]   


 


Levofloxacin [Levaquin] 500 mg PO DAILY 21


 


Morphine Sulfate ER [Ms Contin] 15 mg PO Q12HR 21


 


Phenylephrine HCl/Acetaminophn 1 tab PO BID PRN 21





[Tylenol Sinus Headache Caplet]   








                                  Previous Rx's











 Medication  Instructions  Recorded


 


Clopidogrel [Plavix] 75 mg PO DAILY #30 tab 12/17/15











                                    Allergies











Allergy/AdvReac Type Severity Reaction Status Date / Time


 


cephalexin [From Keflex] Allergy Severe Anaphylaxis Verified 21 22:05


 


aspirin Allergy  Anaphylaxis Verified 21 22:05


 


NSAIDS (Non-Steroidal Allergy  Anaphylaxis Verified 21 22:05





Anti-Inflamma     


 


Penicillins Allergy  Unknown Verified 21 22:05


 


Iodinated Contrast Media AdvReac Mild cold Verified 21 22:05





[Iodinated Contrast Media -   symptoms,  





IV Dye]   runny  





   nose,  





   itchy eyes  





   cough  














Review of Systems


ROS Statement: 


Those systems with pertinent positive or pertinent negative responses have been 

documented in the HPI.





ROS Other: All systems not noted in ROS Statement are negative.





Past Medical History


Past Medical History: Asthma, Coronary Artery Disease (CAD), Cancer, COPD, Deep 

Vein Thrombosis (DVT), Fibromyalgia, GERD/Reflux, Hyperlipidemia, Hypertension, 

Osteoarthritis (OA), Pneumonia, Respiratory Disorder, Vascular Disorder


Additional Past Medical History / Comment(s): pad,chronic bronchitis, nodules on

thyroid, leukoplakia-lip and vocal cords. past" gangrene on right foot", Ileus 

(2020)., herniated discs with back pain., states "food doesnt want to go down"


History of Any Multi-Drug Resistant Organisms: None Reported


Past Surgical History: Appendectomy,  Section, Cholecystectomy, 

Hysterectomy, Joint Replacement, Orthopedic Surgery


Additional Past Surgical History / Comment(s): leukoplakia removed from lip and 

vocal cords.cervical fusion, rt aorto iliac bypass,aortogram w/ runoff.  2015 had stenting of aorto to rt ext iliac bypass, rt foot 2nd toe 

reconstructive sx, rt carotid endarterectomy, egd/colonoscopy ,lt knee 

replacment.lt elbow sx,umbilical hernia repair,bilcarpal tunnel release,lt and 

rt breast bx-neg, partial hysterectomy


Past Anesthesia/Blood Transfusion Reactions: Previous Problems w/ Anesthesia, 

Postoperative Nausea & Vomiting (PONV)


Additional Past Anesthesia/Blood Transfusion Reaction / Comment(s): difficulty 

waking from anesthesia


Past Psychological History: No Psychological Hx Reported


Smoking Status: Current every day smoker


Past Alcohol Use History: Abuse, Daily


Past Drug Use History: None Reported





- Past Family History


  ** Mother


Family Medical History: Cancer, Coronary Artery Disease (CAD), Renal Disease


Additional Family Medical History / Comment(s): CABG, LUNG CANCER,





  ** Brother(s)


Family Medical History: Coronary Artery Disease (CAD), CVA/TIA


Additional Family Medical History / Comment(s): 1 BROTHER CABG, 1 BROTHER STROKE





  ** Father


Family Medical History: Coronary Artery Disease (CAD), Renal Disease


Additional Family Medical History / Comment(s): X4 OPEN HEART SX. HUGE CARDIAC 

HX ON DADS SIDE OF FAMILY





General Exam


Limitations: no limitations





Course


                                   Vital Signs











  21





  20:32 20:35 20:50


 


Temperature 98.4 F 98.3 F 98.3 F


 


Pulse Rate 93  74


 


Respiratory 20  16





Rate   


 


Blood Pressure 93/61  136/64


 


O2 Sat by Pulse 95  97





Oximetry   














  21





  21:05 21:20 21:35


 


Temperature 98.3 F 98.4 F 98.4 F


 


Pulse Rate 76 79 77


 


Respiratory 16 16 14





Rate   


 


Blood Pressure 143/71 116/56 105/50


 


O2 Sat by Pulse 96 97 96





Oximetry   














Medical Decision Making





- Lab Data


Result diagrams: 


                                 21 20:46





                                 21 20:46


                                   Lab Results











  21 Range/Units





  20:40 20:46 20:46 


 


WBC   8.6   (3.8-10.6)  k/uL


 


RBC   4.21   (3.80-5.40)  m/uL


 


Hgb   13.0   (11.4-16.0)  gm/dL


 


Hct   39.0   (34.0-46.0)  %


 


MCV   92.8   (80.0-100.0)  fL


 


MCH   31.0   (25.0-35.0)  pg


 


MCHC   33.4   (31.0-37.0)  g/dL


 


RDW   12.7   (11.5-15.5)  %


 


Plt Count   265   (150-450)  k/uL


 


MPV   7.5   


 


Neutrophils %   60   %


 


Lymphocytes %   31   %


 


Monocytes %   5   %


 


Eosinophils %   2   %


 


Basophils %   1   %


 


Neutrophils #   5.1   (1.3-7.7)  k/uL


 


Lymphocytes #   2.6   (1.0-4.8)  k/uL


 


Monocytes #   0.4   (0-1.0)  k/uL


 


Eosinophils #   0.1   (0-0.7)  k/uL


 


Basophils #   0.1   (0-0.2)  k/uL


 


PT    9.6  (9.0-12.0)  sec


 


INR    0.9  (<1.2)  


 


APTT    22.3  (22.0-30.0)  sec


 


Sodium     (137-145)  mmol/L


 


Potassium     (3.5-5.1)  mmol/L


 


Chloride     ()  mmol/L


 


Carbon Dioxide     (22-30)  mmol/L


 


Anion Gap     mmol/L


 


BUN     (7-17)  mg/dL


 


Creatinine     (0.52-1.04)  mg/dL


 


Est GFR (CKD-EPI)AfAm     (>60 ml/min/1.73 sqM)  


 


Est GFR (CKD-EPI)NonAf     (>60 ml/min/1.73 sqM)  


 


Glucose     (74-99)  mg/dL


 


POC Glucose (mg/dL)  99    (75-99)  mg/dL


 


POC Glu Operater ID  Bradley George    


 


Calcium     (8.4-10.2)  mg/dL


 


Total Bilirubin     (0.2-1.3)  mg/dL


 


AST     (14-36)  U/L


 


ALT     (4-34)  U/L


 


Alkaline Phosphatase     ()  U/L


 


Troponin I     (0.000-0.034)  ng/mL


 


Total Protein     (6.3-8.2)  g/dL


 


Albumin     (3.5-5.0)  g/dL














  21 Range/Units





  20:46 20:46 


 


WBC    (3.8-10.6)  k/uL


 


RBC    (3.80-5.40)  m/uL


 


Hgb    (11.4-16.0)  gm/dL


 


Hct    (34.0-46.0)  %


 


MCV    (80.0-100.0)  fL


 


MCH    (25.0-35.0)  pg


 


MCHC    (31.0-37.0)  g/dL


 


RDW    (11.5-15.5)  %


 


Plt Count    (150-450)  k/uL


 


MPV    


 


Neutrophils %    %


 


Lymphocytes %    %


 


Monocytes %    %


 


Eosinophils %    %


 


Basophils %    %


 


Neutrophils #    (1.3-7.7)  k/uL


 


Lymphocytes #    (1.0-4.8)  k/uL


 


Monocytes #    (0-1.0)  k/uL


 


Eosinophils #    (0-0.7)  k/uL


 


Basophils #    (0-0.2)  k/uL


 


PT    (9.0-12.0)  sec


 


INR    (<1.2)  


 


APTT    (22.0-30.0)  sec


 


Sodium  136 L   (137-145)  mmol/L


 


Potassium  3.3 L   (3.5-5.1)  mmol/L


 


Chloride  100   ()  mmol/L


 


Carbon Dioxide  27   (22-30)  mmol/L


 


Anion Gap  9   mmol/L


 


BUN  13   (7-17)  mg/dL


 


Creatinine  0.85   (0.52-1.04)  mg/dL


 


Est GFR (CKD-EPI)AfAm  85   (>60 ml/min/1.73 sqM)  


 


Est GFR (CKD-EPI)NonAf  73   (>60 ml/min/1.73 sqM)  


 


Glucose  94   (74-99)  mg/dL


 


POC Glucose (mg/dL)    (75-99)  mg/dL


 


POC Glu Operater ID    


 


Calcium  9.5   (8.4-10.2)  mg/dL


 


Total Bilirubin  0.3   (0.2-1.3)  mg/dL


 


AST  20   (14-36)  U/L


 


ALT  10   (4-34)  U/L


 


Alkaline Phosphatase  94   ()  U/L


 


Troponin I   <0.012  (0.000-0.034)  ng/mL


 


Total Protein  6.5   (6.3-8.2)  g/dL


 


Albumin  4.1   (3.5-5.0)  g/dL














Critical Care Time


Critical Care Time: Yes


Total Critical Care Time: 33





Disposition


Clinical Impression: 


 Cerebrovascular accident (CVA)





Disposition: ADMITTED AS IP TO THIS [facility]


Condition: Fair


Referrals: 


Parmjit Roe MD [Primary Care Provider] - 1-2 days

## 2021-09-21 NOTE — CT
EXAMINATION TYPE: CT angio head neck

 

DATE OF EXAM: 9/21/2021

 

HISTORY: POSSIBLE STROKE

 

COMPARISON: None available

 

CT DLP: 374.8 mGycm.  Automated Exposure Control for Dose Reduction was Utilized.

 

TECHNIQUE:  CTA scan of the head/neck is performed with IV Contrast, patient injected with 65 mL of I
sovue 370, axial images are obtained, coronal and sagittal reformatted images are reviewed. 3D recons
tructed images are created on an independent workstation and reviewed.

 

FINDINGS:

 

There is incidental 1.8 cm right thyroid nodule with internal calcifications seen.

 

There is moderate to severe atherosclerosis of the aortic arch and carotid bulbs, most notable of the
 right carotid bulb. There is severe greater than 70% stenosis of the right ICA origin and moderate t
o severe approximately 50-70% stenosis of the left ICA origin. Otherwise normal three-vessel branchin
g of the aorta. No additional high-grade stenosis, dissection or aneurysm seen in the bilateral commo
n carotid, cervical internal carotid and vertebral arteries.

 

 

There is no evidence of high-grade stenosis, dissection or aneurysm in the intracranial internal bennett
tid arteries, anterior, middle and posterior cerebral arteries as well as in the imaged vertebral and
 basilar arteries. The communicating arteries are unremarkable. There is note of a nondominant left v
ertebral artery with dysplastic V4 segment. C5-C7 fusion and centrilobular emphysema seen.

 

 

IMPRESSION:

 

No acute abnormality of the CTA head/neck.

 

Severe right and moderate to severe left proximal ICA stenosis.

 

 

NASCET criteria was used in interpretation of this exam?

## 2021-09-22 LAB
ANION GAP SERPL CALC-SCNC: 8 MMOL/L
BASOPHILS # BLD AUTO: 0 K/UL (ref 0–0.2)
BASOPHILS NFR BLD AUTO: 0 %
BUN SERPL-SCNC: 12 MG/DL (ref 7–17)
CALCIUM SPEC-MCNC: 9.1 MG/DL (ref 8.4–10.2)
CHLORIDE SERPL-SCNC: 107 MMOL/L (ref 98–107)
CHOLEST SERPL-MCNC: 141 MG/DL (ref 0–200)
CO2 SERPL-SCNC: 20 MMOL/L (ref 22–30)
EOSINOPHIL # BLD AUTO: 0 K/UL (ref 0–0.7)
EOSINOPHIL NFR BLD AUTO: 0 %
ERYTHROCYTE [DISTWIDTH] IN BLOOD BY AUTOMATED COUNT: 3.95 M/UL (ref 3.8–5.4)
ERYTHROCYTE [DISTWIDTH] IN BLOOD: 12.9 % (ref 11.5–15.5)
GLUCOSE SERPL-MCNC: 151 MG/DL (ref 74–99)
HCT VFR BLD AUTO: 37.6 % (ref 34–46)
HDLC SERPL-MCNC: 43 MG/DL (ref 40–60)
HGB BLD-MCNC: 12.4 GM/DL (ref 11.4–16)
LDLC SERPL CALC-MCNC: 79.6 MG/DL (ref 0–131)
LYMPHOCYTES # SPEC AUTO: 0.7 K/UL (ref 1–4.8)
LYMPHOCYTES NFR SPEC AUTO: 13 %
MCH RBC QN AUTO: 31.4 PG (ref 25–35)
MCHC RBC AUTO-ENTMCNC: 33 G/DL (ref 31–37)
MCV RBC AUTO: 95 FL (ref 80–100)
MONOCYTES # BLD AUTO: 0.1 K/UL (ref 0–1)
MONOCYTES NFR BLD AUTO: 2 %
NEUTROPHILS # BLD AUTO: 4.4 K/UL (ref 1.3–7.7)
NEUTROPHILS NFR BLD AUTO: 84 %
PLATELET # BLD AUTO: 233 K/UL (ref 150–450)
POTASSIUM SERPL-SCNC: 3.9 MMOL/L (ref 3.5–5.1)
SODIUM SERPL-SCNC: 135 MMOL/L (ref 137–145)
TRIGL SERPL-MCNC: 92 MG/DL (ref 0–149)
VIT B12 SERPL-MCNC: 182 PG/ML (ref 200–944)
VLDLC SERPL CALC-MCNC: 18.4 MG/DL (ref 5–40)
WBC # BLD AUTO: 5.2 K/UL (ref 3.8–10.6)

## 2021-09-22 RX ADMIN — CLOPIDOGREL BISULFATE SCH MG: 75 TABLET ORAL at 07:50

## 2021-09-22 RX ADMIN — CEFAZOLIN SCH MLS/HR: 330 INJECTION, POWDER, FOR SOLUTION INTRAMUSCULAR; INTRAVENOUS at 20:32

## 2021-09-22 RX ADMIN — POTASSIUM CHLORIDE SCH: 7.46 INJECTION, SOLUTION INTRAVENOUS at 05:56

## 2021-09-22 RX ADMIN — DICYCLOMINE HYDROCHLORIDE SCH MG: 20 TABLET ORAL at 16:20

## 2021-09-22 RX ADMIN — IPRATROPIUM BROMIDE SCH MG: 0.5 SOLUTION RESPIRATORY (INHALATION) at 16:48

## 2021-09-22 RX ADMIN — LOSARTAN POTASSIUM SCH MG: 50 TABLET, FILM COATED ORAL at 07:51

## 2021-09-22 RX ADMIN — IPRATROPIUM BROMIDE SCH: 0.5 SOLUTION RESPIRATORY (INHALATION) at 20:11

## 2021-09-22 RX ADMIN — CEFAZOLIN SCH: 330 INJECTION, POWDER, FOR SOLUTION INTRAMUSCULAR; INTRAVENOUS at 21:20

## 2021-09-22 RX ADMIN — FLUTICASONE PROPIONATE SCH: 50 SPRAY, METERED NASAL at 07:50

## 2021-09-22 RX ADMIN — LACTOBACILLUS ACIDOPHILUS / LACTOBACILLUS BULGARICUS SCH EACH: 100 MILLION CFU STRENGTH GRANULES at 07:50

## 2021-09-22 RX ADMIN — ASPIRIN 325 MG ORAL TABLET SCH: 325 PILL ORAL at 07:51

## 2021-09-22 RX ADMIN — LACTOBACILLUS ACIDOPHILUS / LACTOBACILLUS BULGARICUS SCH: 100 MILLION CFU STRENGTH GRANULES at 20:28

## 2021-09-22 RX ADMIN — MORPHINE SULFATE SCH MG: 15 TABLET, EXTENDED RELEASE ORAL at 20:28

## 2021-09-22 RX ADMIN — DICYCLOMINE HYDROCHLORIDE SCH MG: 20 TABLET ORAL at 20:28

## 2021-09-22 RX ADMIN — IPRATROPIUM BROMIDE SCH: 0.5 SOLUTION RESPIRATORY (INHALATION) at 11:33

## 2021-09-22 RX ADMIN — IPRATROPIUM BROMIDE SCH MG: 0.5 SOLUTION RESPIRATORY (INHALATION) at 07:24

## 2021-09-22 RX ADMIN — CYANOCOBALAMIN SCH: 1000 INJECTION, SOLUTION INTRAMUSCULAR; SUBCUTANEOUS at 15:56

## 2021-09-22 NOTE — US
EXAMINATION TYPE: US carotid duplex BILAT

 

DATE OF EXAM: 9/22/2021

 

COMPARISON: CTA neck from yesterday

 

CLINICAL HISTORY: right sided weakness. Hx right CCA surgery per patient.  

***Suboptimal exam due to patient unable to stay still from pain

 

EXAM MEASUREMENTS: 

 

RIGHT:  Peak Systolic Velocity (PSV) cm/sec

----- Right CCA:  29.1  

----- Right ICA:  139.9     

----- Right ECA:  76.8   

ICA/CCA ratio:  4.8    

 

RIGHT:  End Diastole cm/sec

----- Right CCA:  12.0   

----- Right ICA:  28.3      

----- Right ECA:  19.7     

 

LEFT:  Peak Systolic Velocity (PSV) cm/sec

----- Left CCA:  120.0  

----- Left ICA:  267.6   

----- Left ECA:  337.4  

ICA/CCA ratio:  2.2  

 

LEFT:  End Diastole cm/sec

----- Left CCA:  43.1  

----- Left ICA:  38.4   

----- Left ECA:  94.2 

 

VERTEBRALS (direction of flow):

Right Vertebral: Antegrade

Left Vertebral: Antegrade

 

Rhythm:  Normal

 

Suboptimal study due to patient inability to hold still. Severe plaque right carotid bulb and gray sc
venkatesh images with increased peak systolic velocity in abnormal ratio. Moderate to severe plaque left ca
rotid bulb on ultrasound with increased peak systolic velocity left internal carotid artery and abnor
mal ratio. 

 

IMPRESSION:  Suboptimal study. Bilateral significant stenosis seen better on CTA versus ultrasound.  
 Focal Stenosis greater than 70% on the right is thought present on CTA and between 50-69% on the lef
t based on CTA and ultrasound.

 

 

Criteria for Assigning % of Stenosis / Diameter reduction

(Estimation based on the indirect measurements of the internal carotid artery velocities (ICA PSV).

1.  Normal (no stenosis)=ICA PSV < 125 cm/s: ratio < 2.0: ICA EDV<40 cm/s.

2. Less than 50% stenosis=ICA PSV < 125 cm/s: ratio < 2.0: ICA EDV<40 cm/s.

3.  50 to 69% stenosis=ICA PSV of 125 to 230 cm/s: ration 2.0 ? 4.0: ICA EDV  cm/s.

4.  Greater than 70% stenosis to near occlusion= ICA PSV > 230 cm/s: ratio > 4.0: ICA EDV > 100 cm/s.
 

5.  Near occlusion= ICA PSV velocities may be low or undetectable: variable ratio and ICA EDV.

6.  Total occlusion=unable to detect flow.

## 2021-09-22 NOTE — P.HPIM
History of Present Illness


H&P Date: 21


Chief Complaint: Right-sided facial paresthesia/left-sided arm drift/cephalgia


61-year-old female with significant medical history of coronary artery disease, 

asthma, COPD, history of DVTs, fibromyalgia, GERD/reflux, hyperlipidemia, 

hypertension, osteoarthritis, carotid stenosis, history of right aortic iliac 

bypass, history of right-sided endarterectomy, and frequent migraines presented 

to the hospital with with right sided facial numbness, left-sided arm weakness, 

paresthesia to right foot and cephalgia for duration greater than 24 hours.  

Patient had extensive diagnostic workup in the emergency department code stroke 

was activated, CT of the brain with noncontrast, no acute intercranial 

abnormalities noted, CT angiogram head and neck, severe stenosis 70% to the r

ight ICA, moderate to severe stenosis 50-70% left ICA.  Review of diagnostic 

labs, potassium 3.3,troponin negative, additional labs within acceptable range. 

Consultation with neurology for possible transient ischemic 

attack/cerebrovascular accident.  Consultation with vascular for severe carotid 

stenosis.





2021


Patient seen and examined emergency department.  Patient resting comfortably in 

bed.  Patient endorses right-sided facial numbness, left-sided arm weakness, and

visual changes.  Patient denies fever, chills, shortness of breath, chest pain, 

palpitations, abdominal pain, nausea or vomiting at this time.  See above for 

review of diagnostic testing.








Review of Systems


Constitutional: Reports weight gain


Eyes: bilateral blurred vision (Patient states floaters noted bilateral)


Ears, nose, mouth and throat: Reports headache


Cardiovascular: Reports as per HPI


Respiratory: Reports as per HPI


Gastrointestinal: Reports as per HPI


Genitourinary: Reports as per HPI


Musculoskeletal: Reports arm numbness/tingling (Right-sided facial)


Neurological: Reports migraines, Reports paresthesias (Right-sided facial/right-

sided lower extremity), Reports tingling, Reports visual changes (Patient states

floaters in bilateral eyes)


Psychiatric: Reports anxiety


Endocrine: Reports as per HPI


Hematologic/Lymphatic: Reports as per HPI


Allergic/Immunologic: Reports as per HPI





Past Medical History


Past Medical History: Asthma, Coronary Artery Disease (CAD), Cancer, COPD, Deep 

Vein Thrombosis (DVT), Fibromyalgia, GERD/Reflux, Hyperlipidemia, Hypertension, 

Osteoarthritis (OA), Pneumonia, Respiratory Disorder, Vascular Disorder


Additional Past Medical History / Comment(s): pad,chronic bronchitis, nodules on

thyroid, leukoplakia-lip and vocal cords. past" gangrene on right foot", Ileus (

2020)., herniated discs with back pain., states "food doesnt want to go down"


History of Any Multi-Drug Resistant Organisms: None Reported


Past Surgical History: Appendectomy,  Section, Cholecystectomy, 

Hysterectomy, Joint Replacement, Orthopedic Surgery


Additional Past Surgical History / Comment(s): leukoplakia removed from lip and 

vocal cords.cervical fusion, rt aorto iliac bypass,aortogram w/ runoff.  2015 had stenting of aorto to rt ext iliac bypass, rt foot 2nd toe 

reconstructive sx, rt carotid endarterectomy, egd/colonoscopy ,lt knee 

replacment.lt elbow sx,umbilical hernia repair,bilcarpal tunnel release,lt and 

rt breast bx-neg, partial hysterectomy


Past Anesthesia/Blood Transfusion Reactions: Previous Problems w/ Anesthesia, 

Postoperative Nausea & Vomiting (PONV)


Additional Past Anesthesia/Blood Transfusion Reaction / Comment(s): difficulty 

waking from anesthesia


Past Psychological History: No Psychological Hx Reported


Additional Psychological History / Comment(s): .


Smoking Status: Current every day smoker


Past Alcohol Use History: Abuse, Daily


Additional Past Alcohol Use History / Comment(s): started smoking 1977 down from

2 ppd to 1/2 ppd.  states recently drinking "fireball whiskey " in her coffee at

night to help with pain.


Past Drug Use History: None Reported





- Past Family History


  ** Mother


Family Medical History: Cancer, Coronary Artery Disease (CAD), Renal Disease


Additional Family Medical History / Comment(s): CABG, LUNG CANCER,





  ** Brother(s)


Family Medical History: Coronary Artery Disease (CAD), CVA/TIA


Additional Family Medical History / Comment(s): 1 BROTHER CABG, 1 BROTHER STROKE





  ** Father


Family Medical History: Coronary Artery Disease (CAD), Renal Disease


Additional Family Medical History / Comment(s): X4 OPEN HEART SX. HUGE CARDIAC 

HX ON DADS SIDE OF FAMILY





Medications and Allergies


Home Medications and Allergies Comment(s): 


Medications and ALLERGIES reviewed





                                Home Medications











 Medication  Instructions  Recorded  Confirmed  Type


 


Carvedilol 25 mg PO BID 12/11/15 09/21/21 History


 


Losartan Potassium 100 mg PO DAILY 12/11/15 09/21/21 History


 


hydroCHLOROthiazide 25 mg PO DAILY 12/11/15 09/21/21 History


 


Clopidogrel [Plavix] 75 mg PO DAILY #30 tab 12/17/15 09/21/21 Rx


 


oxyCODONE-APAP 5-325MG [Percocet 1 tab PO PC-LUNCH PRN 18 History





5-325 mg]    


 


Pravastatin Sodium [Pravachol] 40 mg PO HS 20 History


 


Fluticasone Nasal Spray [Flonase 2 spr EA NOSTRIL DAILY 21 

History





Nasal Spray]    


 


Ondansetron [Zofran] 4 mg PO Q8HR PRN 21 History


 


diphenhydrAMINE [Benadryl] 25 mg PO DAILY PRN 21 History


 


hydrALAZINE HCL [Apresoline] 25 mg PO TID 21 History


 


Albuterol Sulfate [Albuterol 2 puff PO RT-Q6H PRN 21 History





Sulfate Hfa]    


 


Baclofen 10 mg PO TID PRN 21 History


 


Bio Complete 3 1 tab PO BID 21 History


 


Dicyclomine HCl 20 mg PO TID 21 History


 


Ipratropium Bromide [Atrovent Hfa] 2 puff INHALATION RT-QID 21 

History


 


Ipratropium-Albuterol Nebulize 3 ml INHALATION RT-BID 21 History





[Duoneb 0.5 mg-3 mg/3 ml Soln]    


 


Levofloxacin [Levaquin] 500 mg PO DAILY 21 History


 


Morphine Sulfate ER [Ms Contin] 15 mg PO Q12HR 21 History


 


Phenylephrine HCl/Acetaminophn 1 tab PO BID PRN 21 History





[Tylenol Sinus Headache Caplet]    








                                    Allergies











Allergy/AdvReac Type Severity Reaction Status Date / Time


 


cephalexin [From Keflex] Allergy Severe Anaphylaxis Verified 21 22:05


 


aspirin Allergy  Anaphylaxis Verified 21 22:05


 


NSAIDS (Non-Steroidal Allergy  Anaphylaxis Verified 21 22:05





Anti-Inflamma     


 


Penicillins Allergy  Unknown Verified 21 22:05


 


Iodinated Contrast Media AdvReac Mild cold Verified 21 22:05





[Iodinated Contrast Media -   symptoms,  





IV Dye]   runny  





   nose,  





   itchy eyes  





   cough  














Physical Exam


Vitals: 


                                   Vital Signs











  Temp Pulse Pulse Resp BP BP Pulse Ox


 


 21 16:56   82     


 


 21 16:48   82     


 


 21 16:00  98.3 F   99  18   169/80  95


 


 21 12:00  98.0 F   84  20   165/80  95


 


 21 07:44  97.7 F   89  18   141/75  95


 


 21 07:33   80     


 


 21 07:26   84     


 


 21 05:30  98.1 F  85   18  139/77   94 L


 


 21 03:30  98.3 F  81   16  118/69   94 L


 


 21 01:30  98.3 F  85   16  118/57   98


 


 21 00:30  98.3 F  76   16  146/83   97


 


 21 00:05  98.3 F  77   18  95/48   95


 


 21 23:56  98.4 F  80   16  124/71   95


 


 21 23:30  98.4 F  79   16  124/71   98


 


 21 22:30  98.4 F  79   16  117/65   98


 


 21 21:50  98.3 F  79   16  90/53   97


 


 21 21:35  98.4 F  77   14  105/50   96


 


 21 21:20  98.4 F  79   16  116/56   97


 


 21 21:05  98.3 F  76   16  143/71   96


 


 21 20:50  98.3 F  74   16  136/64   97


 


 21 20:35  98.3 F      


 


 21 20:32  98.4 F  93   20  93/61   95








                                Intake and Output











 21





 06:59 14:59 22:59


 


Intake Total  240 


 


Output Total  0 


 


Balance  240 


 


Intake:   


 


  Oral  240 


 


Output:   


 


  Urine  0 


 


Other:   


 


  Weight 56.245 kg  














- Constitutional


General appearance: cooperative, mild distress





- EENT


Eyes: EOMI, PERRLA, normal appearance


ENT: normal oropharynx


Ears: bilateral: normal





- Neck


Neck: normal ROM





- Respiratory


Respiratory: bilateral: wheezing (Anterior and posterior lung fields)





- Cardiovascular


Normal sinus rhythm





Heart rate: 74


Rhythm: regular


Heart sounds: normal: S1, S2


  ** radial pulse


Peripheral Pulses: bilateral: Normal





  ** dorsalis pedis


Peripheral Pulses: bilateral: Normal





- Gastrointestinal


General gastrointestinal: normal bowel sounds, soft





- Neurologic


Neurologic: CNII-XII intact





- Musculoskeletal


Musculoskeletal: generalized weakness





- Psychiatric


Psychiatric: A&O x's 3





Results


CBC & Chem 7: 


                                 21 04:24





                                 21 04:24


Labs: 


                  Abnormal Lab Results - Last 24 Hours (Table)











  21 Range/Units





  20:46 20:50 04:24 


 


Lymphocytes #     (1.0-4.8)  k/uL


 


Sodium  136 L   135 L  (137-145)  mmol/L


 


Potassium  3.3 L    (3.5-5.1)  mmol/L


 


Carbon Dioxide    20 L  (22-30)  mmol/L


 


Glucose    151 H  (74-99)  mg/dL


 


Vitamin B12   182.0 L   (200.0-944.0)  pg/mL














  21 Range/Units





  04:24 


 


Lymphocytes #  0.7 L  (1.0-4.8)  k/uL


 


Sodium   (137-145)  mmol/L


 


Potassium   (3.5-5.1)  mmol/L


 


Carbon Dioxide   (22-30)  mmol/L


 


Glucose   (74-99)  mg/dL


 


Vitamin B12   (200.0-944.0)  pg/mL











Chest x-ray: report reviewed


CT Scan - head: report reviewed





Thrombosis Risk Factor Assmnt





- Choose All That Apply


Each Risk Factor Represents 2 Points: Age 61-74 years


Thrombosis Risk Factor Assessment Total Risk Factor Score: 2


Thrombosis Risk Factor Assessment Level: Low Risk





Assessment and Plan


Assessment: 


TIA/CVA rule out


Severe carotid stenosis


Temporal arteritis rule out


Cephalgia history of migraines


Conary artery disease


Hypertension


Hyperlipidemia


Fibromyalgia


History of DVT


GERD/reflux


Osteoarthritis


Peripheral artery disease


History of right aortic iliac bypass


History of right carotid enterectomy


History of left knee replacement


Continue dependence


Daily alcohol use


Full code





Plan: 


TIA/CVA rule out, obtain echocardiogram, continue neuro checks per protocol, 

obtain additional diagnostic labs, consultation with neurology for 

recommendations


Severe carotid stenosis, continue Plavix, aspirin, and statin 

therapy,consultation with vascular surgery for possible intervention


Cephalgia, rule out temporal arteritis


COPD without exacerbation, continue breathing treatments around the clock


Continue home medications


Continue to monitor vital signs and diagnostic testing


Further recommendations come based on patient's clinical condition





Time with Patient: Greater than 30

## 2021-09-22 NOTE — P.CNNES
History of Present Illness


Consult date: 21


Requesting physician: Misael Martinez


Reason for Consult: Stroke code


History of Present Illness: 





Patient is a 61-year-old female came to the hospital yesterday at 8:19 PM for 

possible new onset headaches and TIA.  Also complains of multiple neurological 

symptoms as mentioned below.  





Patient has history of migraines when she was young.  She would have migraines 

that would last for 3 days, associated with nausea vomiting light and noise 

sensitivity.  As she has grown older, she has not been having those regular mi

graines.  Patient states that around May 2021, she started noticing flashing and

floaters in the right eye.  About 3 weeks after the onset of visual symptoms, 

she started having "terrific headache", pointing to the right parietal region, 

which wraps around the eyes and upper right nose region.  The headache lasts 

about 2 hours and it is very intense.  She denies any ptosis or watering of the 

eyes, although her daughter has noticed that her face becomes very red and 

appears swollen when it happens.  She saw an ophthalmologist in early 2021, who performed ESR which was normal.  She was told that she had right 

retinal detachment.  However as the headaches have persisted, he wanted patient 

to undergo right temporal artery biopsy.  She is getting headache once a day 

usually in the afternoon and when it happens, she cannot move.  The headache 

does not wake her up from sleep.  Patient states that in the last 2 weeks, she 

has been noticing some floaters in the left eye as well.  Just today, she is 

noticing the headache is extending to the left side of her head.





Patient states couple days ago, she developed numbness of right lower half of 

the face from side of the lip to the ear.  It would occur every time she would 

lay on that side.  If she was sitting, the symptoms would go away.  Yesterday 

she noticed that her whole left arm became numb, couldn't use her hand, head 

very weak  and left arm went limp.  There was no weakness of the legs, or 

facial droop or slurred speech or problem with the vision.  She got concerned 

therefore came to the ER.  Vital signs on arrival blood pressure 93/61, pulse 

rate 93, temperature 98.4.  Blood test shows normal CBC, PT/PTT, sodium 136 

potassium 3.3, normal renal and hepatic panel.  Troponin negative, TFTs normal. 

Corona virus PCR negative.  CT head showed no acute process.  CTA of head and 

neck shows severe right (>70%) and moderate to severe left (50-70%) proximal ICA

stenosis.  Chest x-ray showed no acute process.  EKG shows normal sinus rhythm.





Stroke code was activated.  Patient's NIH stroke scale was 2.  She was not a go

od candidate for TPA due to risks outweigh the benefits.  ED physician discuss 

case with the stroke neurologist who recommended medical management.  Repeat NIH

stroke scale in the ER was 1.  Patient was given loading dose of Plavix 300 mg.





Home medications include HCTZ 25 mg, carvedilol 25 mg twice a day, losartan 100 

mg daily, Plavix 75 mg, oxycodone, Pravachol 40 mg, hydralazine 25 mg 3 times a 

day, Zofran, albuterol, baclofen 10 mg 3 times a day when necessary morphine 

sulfate ER 15 mg every 12 hours.  Patient has been given aspirin 324 mg in the 

ER.  Patient states that she has chronic back pain, for which she follows up 

with Dr. Hanna office.





Patient has history of right CEA in .  Patient has chronic back problem, as 

she was a gymnast for 13 years.  She has suffered from a lot of injuries to her 

spine.  She had cervical fusion, knee replacement.  Patient has hypertension den

ies diabetes.  She has smoked half to 1 pack per day for 40 years.  Denies any 

alcohol use or any drugs.





Review of Systems





As above in detail.  Denies any problem with control of bowels or bladder.  No 

chest pain, abdominal pain, nausea vomiting diarrhea.  Denies double vision.  

Denies loss of hearing.  No fever or chills.  No rash.  No weight gain.  All 

other review of systems reviewed and noncontributory.





Past Medical History


Past Medical History: Asthma, Coronary Artery Disease (CAD), Cancer, COPD, Deep 

Vein Thrombosis (DVT), Fibromyalgia, GERD/Reflux, Hyperlipidemia, Hypertension, 

Osteoarthritis (OA), Pneumonia, Respiratory Disorder, Vascular Disorder


Additional Past Medical History / Comment(s): pad,chronic bronchitis, nodules on

thyroid, leukoplakia-lip and vocal cords. past" gangrene on right foot", Ileus 

(2020)., herniated discs with back pain., states "food doesnt want to go down"


History of Any Multi-Drug Resistant Organisms: None Reported


Past Surgical History: Appendectomy,  Section, Cholecystectomy, 

Hysterectomy, Joint Replacement, Orthopedic Surgery


Additional Past Surgical History / Comment(s): leukoplakia removed from lip and 

vocal cords.cervical fusion, rt aorto iliac bypass,aortogram w/ runoff.  2015 had stenting of aorto to rt ext iliac bypass, rt foot 2nd toe 

reconstructive sx, rt carotid endarterectomy, egd/colonoscopy ,lt knee 

replacment.lt elbow sx,umbilical hernia repair,bilcarpal tunnel release,lt and 

rt breast bx-neg, partial hysterectomy


Past Anesthesia/Blood Transfusion Reactions: Previous Problems w/ Anesthesia, 

Postoperative Nausea & Vomiting (PONV)


Additional Past Anesthesia/Blood Transfusion Reaction / Comment(s): difficulty 

waking from anesthesia


Past Psychological History: No Psychological Hx Reported


Additional Psychological History / Comment(s): .


Smoking Status: Current every day smoker


Past Alcohol Use History: Abuse, Daily


Additional Past Alcohol Use History / Comment(s): started smoking 1977 down from

2 ppd to 1/2 ppd.  states recently drinking "fireball whiskey " in her coffee at

night to help with pain.


Past Drug Use History: None Reported





- Past Family History


  ** Mother


Family Medical History: Cancer, Coronary Artery Disease (CAD), Renal Disease


Additional Family Medical History / Comment(s): CABG, LUNG CANCER,





  ** Brother(s)


Family Medical History: Coronary Artery Disease (CAD), CVA/TIA


Additional Family Medical History / Comment(s): 1 BROTHER CABG, 1 BROTHER STROKE





  ** Father


Family Medical History: Coronary Artery Disease (CAD), Renal Disease


Additional Family Medical History / Comment(s): X4 OPEN HEART SX. HUGE CARDIAC 

HX ON DADS SIDE OF FAMILY





Medications and Allergies


                                Home Medications











 Medication  Instructions  Recorded  Confirmed  Type


 


Carvedilol 25 mg PO BID 12/11/15 09/21/21 History


 


Losartan Potassium 100 mg PO DAILY 12/11/15 09/21/21 History


 


hydroCHLOROthiazide 25 mg PO DAILY 12/11/15 09/21/21 History


 


Clopidogrel [Plavix] 75 mg PO DAILY #30 tab 12/17/15 09/21/21 Rx


 


oxyCODONE-APAP 5-325MG [Percocet 1 tab PO PC-LUNCH PRN 18 History





5-325 mg]    


 


Pravastatin Sodium [Pravachol] 40 mg PO HS 20 History


 


Fluticasone Nasal Spray [Flonase 2 spr EA NOSTRIL DAILY 21 

History





Nasal Spray]    


 


Ondansetron [Zofran] 4 mg PO Q8HR PRN 21 History


 


diphenhydrAMINE [Benadryl] 25 mg PO DAILY PRN 21 History


 


hydrALAZINE HCL [Apresoline] 25 mg PO TID 21 History


 


Albuterol Sulfate [Albuterol 2 puff PO RT-Q6H PRN 21 History





Sulfate Hfa]    


 


Baclofen 10 mg PO TID PRN 21 History


 


Bio Complete 3 1 tab PO BID 21 History


 


Dicyclomine HCl 20 mg PO TID 21 History


 


Ipratropium Bromide [Atrovent Hfa] 2 puff INHALATION RT-QID 21 

History


 


Ipratropium-Albuterol Nebulize 3 ml INHALATION RT-BID 21 History





[Duoneb 0.5 mg-3 mg/3 ml Soln]    


 


Levofloxacin [Levaquin] 500 mg PO DAILY 21 History


 


Morphine Sulfate ER [Ms Contin] 15 mg PO Q12HR 21 History


 


Phenylephrine HCl/Acetaminophn 1 tab PO BID PRN 21 History





[Tylenol Sinus Headache Caplet]    








                                    Allergies











Allergy/AdvReac Type Severity Reaction Status Date / Time


 


cephalexin [From Keflex] Allergy Severe Anaphylaxis Verified 21 22:05


 


aspirin Allergy  Anaphylaxis Verified 21 22:05


 


NSAIDS (Non-Steroidal Allergy  Anaphylaxis Verified 21 22:05





Anti-Inflamma     


 


Penicillins Allergy  Unknown Verified 21 22:05


 


Iodinated Contrast Media AdvReac Mild cold Verified 21 22:05





[Iodinated Contrast Media -   symptoms,  





IV Dye]   runny  





   nose,  





   itchy eyes  





   cough  














Physical Examination





- Vital Signs


Vital Signs: 


                                   Vital Signs











  Temp Pulse Pulse Resp BP BP Pulse Ox


 


 21 07:44  97.7 F   89  18   141/75  95


 


 21 07:33   80     


 


 21 07:26   84     


 


 21 05:30  98.1 F  85   18  139/77   94 L


 


 21 03:30  98.3 F  81   16  118/69   94 L


 


 21 01:30  98.3 F  85   16  118/57   98


 


 21 00:30  98.3 F  76   16  146/83   97


 


 21 00:05  98.3 F  77   18  95/48   95


 


 21 23:56  98.4 F  80   16  124/71   95


 


 21 23:30  98.4 F  79   16  124/71   98


 


 21 22:30  98.4 F  79   16  117/65   98


 


 21 21:50  98.3 F  79   16  90/53   97


 


 21 21:35  98.4 F  77   14  105/50   96


 


 21 21:20  98.4 F  79   16  116/56   97


 


 21 21:05  98.3 F  76   16  143/71   96


 


 21 20:50  98.3 F  74   16  136/64   97


 


 21 20:35  98.3 F      


 


 21 20:32  98.4 F  93   20  93/61   95








                                Intake and Output











 21





 22:59 06:59 14:59


 


Other:   


 


  Weight 56.245 kg 56.245 kg 














Patient is a late middle aged  female, in no acute distress. 


Patient is alert awake oriented to time place and person.  Speech and language 

functions are normal.  No aphasia or dysarthria.  Attention, concentration and 

fund of knowledge is adequate.





On cranial examination, pupils are round and reacting to light, visual fields 

are full on confrontation, extraocular muscles are intact with no nystagmus.  

Face is symmetric, tongue protrudes to the midline.  Palatal elevation and 

sensation normal, hearing and shoulder shrug normal, facial sensation normal.  

Shoulder shrug normal.





On muscle strength testing, there is mild left pronator drift.  However the 

strength is normal in arms and legs distally and proximally.





Deep tendon reflexes are symmetric, 1+ at biceps and brachioradialis, 2 at the 

knees, 1 at ankles and plantars are downgoing.





Sensory to touch is equal with no neglect.





Cerebellar function showed no ataxia for finger-to-nose testing.  No 

dysdiadochokinesia.  Tone and bulk of muscles normal.





Gait normal.





On general examination, there is bilateral carotid bruit, no murmur, S1-S2 

audible.  Abdomen is soft nontender.  Chest is clear.  Peripheral pulses are 

present.  No edema.





Results





- Laboratory Findings


CBC and BMP: 


                                 21 09:36





                                 21 09:36


Abnormal Lab Findings: 


                                  Abnormal Labs











  21





  20:46 04:24 04:24


 


Lymphocytes #    0.7 L


 


Sodium  136 L  135 L 


 


Potassium  3.3 L  


 


Carbon Dioxide   20 L 


 


Glucose   151 H 














Assessment and Plan


Assessment: 





* Probable stroke/TIA manifesting with transient left arm weakness.  Her current

  NIH stroke scale is 1 with left arm pronator drift.  Patient was not a 

  candidate for TPA.  Patient has been on Plavix for long time since she 

  underwent left CEA in .  Patient had stopped Plavix one week ago, in 

  anticipation for epidural injection, which probably resulted in these TIA.


* Bilateral ICA stenosis, > 70% on the right, and 50-70% on the left.  Patient 

  has history of left CEA in .


* New onset intermittent headaches involving right parietal orbital region, of 

  unclear etiology.  Rule out atypical cluster headaches, occipital neuralgia, 

  or other secondary causes.


* Previous history of migraine headaches.


* Bilateral eye floaters, with diagnosis of retinal detachment on the right.  

  Rule out temporal arteritis.


* Hypertension


* Tobacco use


* Vitamin B12 deficiency


* Chronic low back pain.


Plan: 





* MRI of the brain with and without contrast to evaluate for stroke, rule out 

  secondary causes of cephalgia.


* Patient has been resumed on Plavix 75 mg daily.  Patient states she is 

  ALLERGIC to aspirin.


* Vascular surgical consultation for bilateral ICA stenosis, severe on the left 

  side.


* ESR is 12.  CRP 0.7 both normal.  We will discuss with vascular surgery, if 

  temporal artery biopsy is necessary.  Recommend ophthalmology consultation.


* Lipid panel with cholesterol 141, LDL 79.6, HDL 43 and triglycerides 92.  

  Continue pravastatin 40 mg daily.


* B12 182, which is significantly low.  We will start B12 replacement.  Folate 

  11.3.  TFTs are normal.


* Thank you for the consultation.





Time with Patient: Greater than 30

## 2021-09-22 NOTE — P.GSCN
History of Present Illness


Consult date: 21


Reason for Consult: 





Carotid stenosis


Requesting physician: Elmo Singh


History of present illness: 





This is a 61-year-old female who presented to the emergency department yesterday

with complaints of bilateral upper extremity weakness, left greater than right. 

Bilateral lower extremity tingling.  She has a past medical history of 

peripheral arterial disease status post aorta iliac bypass stenting, vascular 

disease status post right carotid endarterectomy , coronary artery disease, 

asthma, COPD, and skin cancer.  The patient states she has a lot of issues with 

her neck and back, she's had previous cervical surgery.  She sees Dr. Correa 

and receives injections for her neck and back pain.  Patient also states she's 

been having headaches for the last 2 months duration and seeing flashers and 

floaters with blurred vision in the right knee.  She states that the headache is

mostly in the right temporal and parietal region.  Patient states they were 

working her up for temporal arteritis.  States she had previous blood work that 

showed no inflammation.  She was currently being treated with carbamazepine but 

stopped about a week and a half ago due to abdominal cramping.  She is now 

stating she is getting headaches on the left side of her head and some floaters 

in her left eye.  Weakness has improved bilaterally.  Patient states that the 

left upper extremity started to get numb and tingly and then heavier and then an

attempt she came to the emergency department she felt like her she could not 

lift her arm.  She states she is right-hand dominant.  She has no previous 

history of stroke.  Dr. Rinaldi performed right lower extremity aorta iliac bypass,

she is unsure who did her carotid endarterectomy.  Thinks possibly Dr. Deleon.

 She is currently denying any focal symptoms other then slight blurred vision in

the right eye and headache.  She states she does follow regularly with Dr. Rinaldi 

for coronary artery disease and monitoring of carotids.  States she has not seen

him recently, maybe about 1 year ago.  Patient currently denies any shortness of

breath, chest pain, abnormal pains, nausea, vomiting, fevers or chills.  She 

states she still gets floaters and flashes in right eye and now in left eye.  

This happens periodically   Bilateral upper extremity and lower extremity 

weakness has improved.  She is denying any loss of vision, denies any slurred 

speech or difficulty with getting words out.  Denies any difficulty with 

swallowing.  She is alert and oriented 3.  





Review of Systems





A 14 point review of systems was completed and all pertinent positives and 

negatives as stated in the HPI





Past Medical History


Past Medical History: Asthma, Coronary Artery Disease (CAD), Cancer, COPD, Deep 

Vein Thrombosis (DVT), Fibromyalgia, GERD/Reflux, Hyperlipidemia, Hypertension, 

Osteoarthritis (OA), Pneumonia, Respiratory Disorder, Vascular Disorder


Additional Past Medical History / Comment(s): pad,chronic bronchitis, nodules on

thyroid, leukoplakia-lip and vocal cords. past" gangrene on right foot", Ileus 

(2020)., herniated discs with back pain., states "food doesnt want to go down"


History of Any Multi-Drug Resistant Organisms: None Reported


Past Surgical History: Appendectomy,  Section, Cholecystectomy, 

Hysterectomy, Joint Replacement, Orthopedic Surgery


Additional Past Surgical History / Comment(s): leukoplakia removed from lip and 

vocal cords.cervical fusion, rt aorto iliac bypass,aortogram w/ runoff.  2015 had stenting of aorto to rt ext iliac bypass, rt foot 2nd toe 

reconstructive sx, rt carotid endarterectomy, egd/colonoscopy ,lt knee 

replacment.lt elbow sx,umbilical hernia repair,bilcarpal tunnel release,lt and 

rt breast bx-neg, partial hysterectomy


Past Anesthesia/Blood Transfusion Reactions: Previous Problems w/ Anesthesia, 

Postoperative Nausea & Vomiting (PONV)


Additional Past Anesthesia/Blood Transfusion Reaction / Comm: difficulty waking 

from anesthesia


Past Psychological History: No Psychological Hx Reported


Additional Psychological History / Comment(s): .


Smoking Status: Current every day smoker


Past Alcohol Use History: Abuse, Daily


Additional Past Alcohol Use History / Comment(s): started smoking 1977 down from

2 ppd to 1/2 ppd.  states recently drinking "fireball whiskey " in her coffee at

night to help with pain.


Past Drug Use History: None Reported





- Past Family History


  ** Mother


Family Medical History: Cancer, Coronary Artery Disease (CAD), Renal Disease


Additional Family Medical History / Comment(s): CABG, LUNG CANCER,





  ** Brother(s)


Family Medical History: Coronary Artery Disease (CAD), CVA/TIA


Additional Family Medical History / Comment(s): 1 BROTHER CABG, 1 BROTHER STROKE





  ** Father


Family Medical History: Coronary Artery Disease (CAD), Renal Disease


Additional Family Medical History / Comment(s): X4 OPEN HEART SX. HUGE CARDIAC 

HX ON DADS SIDE OF FAMILY





Medications and Allergies


                                Home Medications











 Medication  Instructions  Recorded  Confirmed  Type


 


Carvedilol 25 mg PO BID 12/11/15 09/21/21 History


 


Losartan Potassium 100 mg PO DAILY 12/11/15 09/21/21 History


 


hydroCHLOROthiazide 25 mg PO DAILY 12/11/15 09/21/21 History


 


Clopidogrel [Plavix] 75 mg PO DAILY #30 tab 12/17/15 09/21/21 Rx


 


oxyCODONE-APAP 5-325MG [Percocet 1 tab PO PC-LUNCH PRN 18 History





5-325 mg]    


 


Pravastatin Sodium [Pravachol] 40 mg PO HS 20 History


 


Fluticasone Nasal Spray [Flonase 2 spr EA NOSTRIL DAILY 21 

History





Nasal Spray]    


 


Ondansetron [Zofran] 4 mg PO Q8HR PRN 21 History


 


diphenhydrAMINE [Benadryl] 25 mg PO DAILY PRN 21 History


 


hydrALAZINE HCL [Apresoline] 25 mg PO TID 21 History


 


Albuterol Sulfate [Albuterol 2 puff PO RT-Q6H PRN 21 History





Sulfate Hfa]    


 


Baclofen 10 mg PO TID PRN 21 History


 


Bio Complete 3 1 tab PO BID 21 History


 


Dicyclomine HCl 20 mg PO TID 21 History


 


Ipratropium Bromide [Atrovent Hfa] 2 puff INHALATION RT-QID 21 

History


 


Ipratropium-Albuterol Nebulize 3 ml INHALATION RT-BID 21 History





[Duoneb 0.5 mg-3 mg/3 ml Soln]    


 


Levofloxacin [Levaquin] 500 mg PO DAILY 21 History


 


Morphine Sulfate ER [Ms Contin] 15 mg PO Q12HR 21 History


 


Phenylephrine HCl/Acetaminophn 1 tab PO BID PRN 21 History





[Tylenol Sinus Headache Caplet]    








                                    Allergies











Allergy/AdvReac Type Severity Reaction Status Date / Time


 


cephalexin [From Keflex] Allergy Severe Anaphylaxis Verified 21 22:05


 


aspirin Allergy  Anaphylaxis Verified 21 22:05


 


NSAIDS (Non-Steroidal Allergy  Anaphylaxis Verified 21 22:05





Anti-Inflamma     


 


Penicillins Allergy  Unknown Verified 21 22:05


 


Iodinated Contrast Media AdvReac Mild cold Verified 21 22:05





[Iodinated Contrast Media -   symptoms,  





IV Dye]   runny  





   nose,  





   itchy eyes  





   cough  














Surgical - Exam


                                   Vital Signs











Temp Pulse Resp BP Pulse Ox


 


 98.4 F   93   20   93/61   95 


 


 21 20:32  21 20:32  21 20:32  21 20:32  21 20:32














General appearance: The patient is alert, oriented, appears in no acute 

distress.


HET: Head is normocephalic and atraumatic.  Pupils are equal and reactive.  


Neck: Supple without lymphadenopathy.  Trachea midline.


Heart: S1 S2.  Regular rate and rhythm.


Lungs: Diminished bilaterally.


Abdomen: Soft, nontender, nondistended.


Extremities: Normal skin color and turgor.  No cyanosis, rash, ulceration, 

clubbing, or edema.  Radial and pedal pulses are 2/4 bilaterally.


Neurological: Patient is alert and oriented 3.  Speech is fluent, she answers 

questions appropriately and follows commands.  She has facial symmetry.  Tongue 

protrudes midline.  She has mild weakness to right upper and lower extremity 

compared to left, 4/5.  





Results





- Labs





                                 21 04:24





                                 21 04:24


                  Abnormal Lab Results - Last 24 Hours (Table)











  21 Range/Units





  20:46 04:24 04:24 


 


Lymphocytes #    0.7 L  (1.0-4.8)  k/uL


 


Sodium  136 L  135 L   (137-145)  mmol/L


 


Potassium  3.3 L    (3.5-5.1)  mmol/L


 


Carbon Dioxide   20 L   (22-30)  mmol/L


 


Glucose   151 H   (74-99)  mg/dL








                                 Diabetes panel











  21 Range/Units





  20:46 04:24 


 


Sodium  136 L  135 L  (137-145)  mmol/L


 


Potassium  3.3 L  3.9  (3.5-5.1)  mmol/L


 


Chloride  100  107  ()  mmol/L


 


Carbon Dioxide  27  20 L  (22-30)  mmol/L


 


BUN  13  12  (7-17)  mg/dL


 


Creatinine  0.85  0.75  (0.52-1.04)  mg/dL


 


Glucose  94  151 H  (74-99)  mg/dL


 


Calcium  9.5  9.1  (8.4-10.2)  mg/dL


 


AST  20   (14-36)  U/L


 


ALT  10   (4-34)  U/L


 


Alkaline Phosphatase  94   ()  U/L


 


Total Protein  6.5   (6.3-8.2)  g/dL


 


Albumin  4.1   (3.5-5.0)  g/dL


 


Triglycerides   92.0  (0.0-149.0)  mg/dL


 


HDL Cholesterol   43.0  (40.0-60.0)  mg/dL








                                  Thyroid panel











  21 Range/Units





  20:50 


 


TSH  0.675  (0.465-4.680)  mIU/L








                                  Calcium panel











  21 Range/Units





  20:46 04:24 


 


Calcium  9.5  9.1  (8.4-10.2)  mg/dL


 


Albumin  4.1   (3.5-5.0)  g/dL








                                 Pituitary panel











  21 Range/Units





  20:46 20:50 04:24 


 


Sodium  136 L   135 L  (137-145)  mmol/L


 


Potassium  3.3 L   3.9  (3.5-5.1)  mmol/L


 


Chloride  100   107  ()  mmol/L


 


Carbon Dioxide  27   20 L  (22-30)  mmol/L


 


BUN  13   12  (7-17)  mg/dL


 


Creatinine  0.85   0.75  (0.52-1.04)  mg/dL


 


Glucose  94   151 H  (74-99)  mg/dL


 


Calcium  9.5   9.1  (8.4-10.2)  mg/dL


 


TSH   0.675   (0.465-4.680)  mIU/L








                                  Adrenal panel











  21 Range/Units





  20:46 04:24 


 


Sodium  136 L  135 L  (137-145)  mmol/L


 


Potassium  3.3 L  3.9  (3.5-5.1)  mmol/L


 


Chloride  100  107  ()  mmol/L


 


Carbon Dioxide  27  20 L  (22-30)  mmol/L


 


BUN  13  12  (7-17)  mg/dL


 


Creatinine  0.85  0.75  (0.52-1.04)  mg/dL


 


Glucose  94  151 H  (74-99)  mg/dL


 


Calcium  9.5  9.1  (8.4-10.2)  mg/dL


 


Total Bilirubin  0.3   (0.2-1.3)  mg/dL


 


AST  20   (14-36)  U/L


 


ALT  10   (4-34)  U/L


 


Alkaline Phosphatase  94   ()  U/L


 


Total Protein  6.5   (6.3-8.2)  g/dL


 


Albumin  4.1   (3.5-5.0)  g/dL














- Imaging


Comments: 





Brain CT shows no intracranial abnormality





CT angiogram of head and neck impression states no acute abnormality of the CTA 

head/neck.  Severe right and moderate-to-severe left proximal ICA stenosis.











Assessment and Plan


Assessment: 





1.  Severe right and moderate to severe left proximal ICA stenosis per CT 

angiogram


2.  Bilateral Extremity weakness


3.  Headaches


4.  History of peripheral arterial disease status post right aorta iliac bypass


5.  History of carotid stenosis status post right carotid endarterectomy


6.  Coronary artery disease


7.  COPD


8.  Current smoker


9.  Daily alcohol use


Plan: 





1.  Carotid ultrasound ordered


2.  Sed rate and CRP ordered


3.  Continue aspirin, Plavix, and statin


4.  Appreciate recommendations from neurology


5.  Tobacco cessation


6.  Alcohol abstinence


7.  Further recommendations to follow





Thank you for this consultation and allowing us to take part in the plan of care

of your patient during her hospital stay





The impression and plan of care has been dictated as directed.





Dr. Mendes


I performed a history and examination of this patient,  discussed the same with 

the dictator.  I agree with the dictator's note ,documented as a scribe.  Any 

additional findings or plans will be noted.

## 2021-09-23 VITALS — TEMPERATURE: 98 F

## 2021-09-23 VITALS — DIASTOLIC BLOOD PRESSURE: 68 MMHG | SYSTOLIC BLOOD PRESSURE: 162 MMHG | HEART RATE: 78 BPM

## 2021-09-23 VITALS — RESPIRATION RATE: 16 BRPM

## 2021-09-23 LAB
ANION GAP SERPL CALC-SCNC: 7 MMOL/L
BASOPHILS # BLD AUTO: 0 K/UL (ref 0–0.2)
BASOPHILS NFR BLD AUTO: 0 %
BUN SERPL-SCNC: 14 MG/DL (ref 7–17)
CALCIUM SPEC-MCNC: 9.2 MG/DL (ref 8.4–10.2)
CHLORIDE SERPL-SCNC: 107 MMOL/L (ref 98–107)
CO2 SERPL-SCNC: 23 MMOL/L (ref 22–30)
EOSINOPHIL # BLD AUTO: 0 K/UL (ref 0–0.7)
EOSINOPHIL NFR BLD AUTO: 0 %
ERYTHROCYTE [DISTWIDTH] IN BLOOD BY AUTOMATED COUNT: 3.92 M/UL (ref 3.8–5.4)
ERYTHROCYTE [DISTWIDTH] IN BLOOD: 13.5 % (ref 11.5–15.5)
GLUCOSE SERPL-MCNC: 117 MG/DL (ref 74–99)
HCT VFR BLD AUTO: 36.8 % (ref 34–46)
HGB BLD-MCNC: 12.5 GM/DL (ref 11.4–16)
LYMPHOCYTES # SPEC AUTO: 2.2 K/UL (ref 1–4.8)
LYMPHOCYTES NFR SPEC AUTO: 22 %
MCH RBC QN AUTO: 32 PG (ref 25–35)
MCHC RBC AUTO-ENTMCNC: 34.1 G/DL (ref 31–37)
MCV RBC AUTO: 93.9 FL (ref 80–100)
MONOCYTES # BLD AUTO: 0.4 K/UL (ref 0–1)
MONOCYTES NFR BLD AUTO: 4 %
NEUTROPHILS # BLD AUTO: 7.1 K/UL (ref 1.3–7.7)
NEUTROPHILS NFR BLD AUTO: 71 %
PLATELET # BLD AUTO: 267 K/UL (ref 150–450)
POTASSIUM SERPL-SCNC: 3.2 MMOL/L (ref 3.5–5.1)
SODIUM SERPL-SCNC: 137 MMOL/L (ref 137–145)
WBC # BLD AUTO: 10.1 K/UL (ref 3.8–10.6)

## 2021-09-23 RX ADMIN — LACTOBACILLUS ACIDOPHILUS / LACTOBACILLUS BULGARICUS SCH EACH: 100 MILLION CFU STRENGTH GRANULES at 09:00

## 2021-09-23 RX ADMIN — CLOPIDOGREL BISULFATE SCH MG: 75 TABLET ORAL at 08:59

## 2021-09-23 RX ADMIN — IPRATROPIUM BROMIDE SCH: 0.5 SOLUTION RESPIRATORY (INHALATION) at 17:05

## 2021-09-23 RX ADMIN — POTASSIUM CHLORIDE SCH MEQ: 20 TABLET, EXTENDED RELEASE ORAL at 15:29

## 2021-09-23 RX ADMIN — IPRATROPIUM BROMIDE SCH MG: 0.5 SOLUTION RESPIRATORY (INHALATION) at 08:20

## 2021-09-23 RX ADMIN — CYANOCOBALAMIN SCH MCG: 1000 INJECTION, SOLUTION INTRAMUSCULAR; SUBCUTANEOUS at 08:59

## 2021-09-23 RX ADMIN — FLUTICASONE PROPIONATE SCH SPRAY: 50 SPRAY, METERED NASAL at 08:59

## 2021-09-23 RX ADMIN — MORPHINE SULFATE SCH MG: 15 TABLET, EXTENDED RELEASE ORAL at 08:58

## 2021-09-23 RX ADMIN — DICYCLOMINE HYDROCHLORIDE SCH MG: 20 TABLET ORAL at 08:58

## 2021-09-23 RX ADMIN — DICYCLOMINE HYDROCHLORIDE SCH MG: 20 TABLET ORAL at 15:28

## 2021-09-23 RX ADMIN — LOSARTAN POTASSIUM SCH MG: 50 TABLET, FILM COATED ORAL at 08:58

## 2021-09-23 RX ADMIN — IPRATROPIUM BROMIDE SCH: 0.5 SOLUTION RESPIRATORY (INHALATION) at 11:26

## 2021-09-23 RX ADMIN — ASPIRIN 325 MG ORAL TABLET SCH MG: 325 PILL ORAL at 08:59

## 2021-09-23 NOTE — P.PN
Subjective


Progress Note Date: 09/23/21





Patient's daughter was also present.  Patient states she is feeling much better.

 She offers no new complaints.  The headache has resolved.  She has now residual

normal headache/neck pain, which is related to her previous myofacial injuries 

from being a gymnastic.  No focal weakness.





Objective





- Vital Signs


Vital signs: 


                                   Vital Signs











Temp  98.0 F   09/23/21 12:00


 


Pulse  76   09/23/21 14:00


 


Resp  16   09/23/21 14:00


 


BP  128/78   09/23/21 12:00


 


Pulse Ox  98   09/23/21 12:00








                                 Intake & Output











 09/22/21 09/23/21 09/23/21





 18:59 06:59 18:59


 


Intake Total 480  1140


 


Output Total 0  


 


Balance 480  1140


 


Weight  57 kg 


 


Intake:   


 


  Oral 480  1140


 


Output:   


 


  Urine 0  


 


Other:   


 


  Voiding Method  Toilet Toilet


 


  # Voids  1 














- Exam





Patient's mental status, speech and language functions are normal.


Cranial nerves II through XII are normal.


On muscle strength testing there is no pronator drift and the strength is normal

in arms and legs.


Reflexes symmetric.


Sensations equal.


No ataxia.


Gait normal.





- Labs


CBC & Chem 7: 


                                 09/23/21 09:36





                                 09/23/21 09:36


Labs: 


                  Abnormal Lab Results - Last 24 Hours (Table)











  09/23/21 Range/Units





  09:36 


 


Potassium  3.2 L  (3.5-5.1)  mmol/L


 


Glucose  117 H  (74-99)  mg/dL














Assessment and Plan


Assessment: 





* Acute ischemic stroke manifesting with transient left arm weakness.  Symptoms 

  now completely resolved, with NIH stroke scale of 0.   Patient has been on 

  Plavix for long time since she underwent left CEA in 2014.  Patient had 

  stopped Plavix one week ago, in anticipation for epidural injection, which 

  probably resulted in these TIA.  MRI of the brain confirmed multiple acute 

  areas of small ischemic infarctions involving the right hemispheric region in 

  watershed territory (between MCA/CELINE).


* Bilateral ICA stenosis, > 70% on the right, and 50-70% on the left.  Patient 

  has history of left CEA in 2014.


* New onset intermittent headaches involving right parietal orbital region, of 

  unclear etiology.  Rule out atypical cluster headaches.  Headaches have now 

  completely resolved.  Suspect cephalgia related to acute stroke/embolic 

  phenomenon triggering migraines.


* Previous history of migraine headaches.


* Bilateral eye floaters, with diagnosis of retinal detachment on the right.  

  Rule out temporal arteritis.


* Hypertension


* Tobacco use (1/2 to 1 pack per day for last 40 years)


* Vitamin B12 deficiency


* Chronic low back pain.


Plan: 





* MRI of the brain with and without contrast reported as multiple punctate foci 

  of increased signal on the diffusion-weighted images within the right corona r

  adiata, and centrum semiovale bilaterally felt to reflect embolic insult.  

  Correlate clinically.  I reviewed the MRI, and it only involves the right 

  hemispheric region, in MCA vascular territory.  No acute stroke involving the 

  left hemispheric region.


* Patient has been resumed on Plavix 75 mg daily. (Patient has received a 

  loading dose of Plavix in the ER, therefore no need for Brilinta).  Patient 

  states she is ALLERGIC to aspirin.  Patient has received aspirin 325 mg this 

  morning.  She had no adverse effects.  She'll be maintained on aspirin 81 mg 

  along with Plavix because of high risk for recurrent strokes.  She was 

  recommended to have her family members standby when she takes aspirin tomorrow

  as well.  She does have liquid Benadryl at home.


* Patient's right ICA is symptomatic.  I would recommend right CEA within 1-2 

  weeks.  


* ESR is 12.  CRP 0.7 both normal.  No need for temporal artery biopsy.


* Patient has an appointment with her ophthalmologist in a month, in mid of 

  October 2021.


* Lipid panel with cholesterol 141, LDL 79.6, HDL 43 and triglycerides 92.  

  Continue pravastatin 40 mg daily.


* B12 182, which is significantly low.  We will start B12 replacement.  Patient 

  to take vitamin B12 1000 g IM once monthly as outpatient.  Folate 11.3.  TFTs

  are normal.


* Suggest patient follow up with neurologist in 2-3 weeks.


* Patient was strongly recommended tobacco cessation.


* Neurologically clear for discharge.


Time with Patient: Greater than 30

## 2021-09-23 NOTE — ECHOF
Referral Reason:cva



MEASUREMENTS

--------

HEIGHT: 152.4 cm

WEIGHT: 56.2 kg

BP: 

RVIDd:   3.1 cm     (< 3.3)

IVSd:   1.1 cm     (0.6 - 1.1)

LVIDd:   3.1 cm     (3.9 - 5.3)

LVPWd:   1.6 cm     (0.6 - 1.1)

IVSs:   1.4 cm

LVIDs:   2.2 cm

LVPWs:   0.7 cm

LAESV Index (A-L):   24.26 ml/m

Ao Diam:   2.7 cm     (2.0 - 3.7)

AV Cusp:   1.6 cm     (1.5 - 2.6)

MV EXCURSION:   15.271 mm     (> 18.000)

MV EF SLOPE:   57 mm/s     (70 - 150)

EPSS:   0.3 cm

MV E Niels:   0.92 m/s

MV DecT:   221 ms

MV A Niels:   1.11 m/s

MV E/A Ratio:   0.83 

RAP:   5.00 mmHg

RVSP:   13.79 mmHg







FINDINGS

--------

Sinus rhythm.

This was a technically adequate study.

The left ventricular size is normal.   There is moderate concentric left ventricular hypertrophy.   O
verall left ventricular systolic function is normal with, an EF between 55 - 60 %.   The diastolic fi
lling pattern is normal for the age of the patient 9.62.

The right ventricle is normal in size.

Normal LA  size by volume 22+/-6 ml/m2.

The right atrial size is normal.

The aortic valve is trileaflet, and appears structurally normal. No aortic stenosis or regurgitation.


The mitral valve is normal.   Mild mitral regurgitation is present.

The tricuspid valve appears structurally normal.   Mild tricuspid regurgitation present.   Right vent
ricular systolic pressure is normal at < 35 mmHg.

There is no pulmonic regurgitation present.

The aortic root size is normal.

There is no pericardial effusion.



CONCLUSIONS

--------

1. There is moderate concentric left ventricular hypertrophy.

2. Overall left ventricular systolic function is normal with, an EF between 55 - 60 %.

3. Normal LA size by volume 22+/-6 ml/m2.

4. The aortic valve is trileaflet, and appears structurally normal. No aortic stenosis or regurgitati
on.

5. Mild mitral regurgitation is present.

6. Mild tricuspid regurgitation present.

7. There is no pericardial effusion.





SONOGRAPHER: Angélica Drake RDCS

## 2021-09-23 NOTE — P.PN
Subjective


Progress Note Date: 09/23/21


Principal diagnosis: 


CVA/TIA


Severe carotid stenosis


Cephalgia








61-year-old female with significant medical history of coronary artery disease, 

asthma, COPD, history of DVTs, fibromyalgia, GERD/reflux, hyperlipidemia, 

hypertension, osteoarthritis, carotid stenosis, history of right aortic iliac 

bypass, history of right-sided endarterectomy, and frequent migraines presented 

to the hospital with with right sided facial numbness, left-sided arm weakness, 

paresthesia to right foot and cephalgia for duration greater than 24 hours.  

Patient had extensive diagnostic workup in the emergency department code stroke 

was activated, CT of the brain with noncontrast, no acute intercranial 

abnormalities noted, CT angiogram head and neck, severe stenosis 70% to the 

right ICA, moderate to severe stenosis 50-70% left ICA.  Review of diagnostic 

labs, potassium 3.3,troponin negative, additional labs within acceptable range. 

Consultation with neurology for possible transient ischemic a

ttack/cerebrovascular accident.  Consultation with vascular for severe carotid 

stenosis.





09/22/2021


Patient seen and examined emergency department.  Patient resting comfortably in 

bed.  Patient endorses right-sided facial numbness, left-sided arm weakness, and

visual changes.  Patient denies fever, chills, shortness of breath, chest pain, 

palpitations, abdominal pain, nausea or vomiting at this time.  See above for 

review of diagnostic testing.





09/23/2021


Patient seen and examined at bedside.  Patient resting currently in bed with no 

acute signs of distress.  Patient denies any neuro deficits, weakness, numbness,

or additional complaints.  Patient able to ambulate throughout the room without 

difficulty, speech clear, no appreciated weakness is noted.  Diagnostic testing 

reviewed.  Awaiting on MRI/MRA.











Objective





- Vital Signs


Vital signs: 


                                   Vital Signs











Temp  97.9 F   09/23/21 08:00


 


Pulse  78   09/23/21 08:34


 


Resp  16   09/23/21 08:34


 


BP  143/76   09/23/21 08:00


 


Pulse Ox  92 L  09/23/21 08:00








                                 Intake & Output











 09/22/21 09/23/21 09/23/21





 18:59 06:59 18:59


 


Intake Total 480  420


 


Output Total 0  


 


Balance 480  420


 


Weight  57 kg 


 


Intake:   


 


  Oral 480  420


 


Output:   


 


  Urine 0  


 


Other:   


 


  Voiding Method  Toilet Toilet


 


  # Voids  1 














- Constitutional


General appearance: Present: cooperative, no acute distress





- EENT


Eyes: Present: EOMI, PERRLA


ENT: Present: normal oropharynx


Ears: bilateral: normal





- Neck


Neck: Present: normal ROM





- Respiratory


Respiratory: bilateral: CTA (Anterior and posterior lung fields)





- Cardiovascular


Details: 


Normal sinus rhythm





Heart rate: 84


Rhythm: regular


Heart sounds: normal: S1, S2





- Peripheral pulses


  ** radial pulse


Peripheral Pulses: bilateral: Normal





  ** dorsalis pedis


Peripheral Pulses: bilateral: Normal





- Gastrointestinal


General gastrointestinal: Present: normal bowel sounds, soft





- Integumentary


Integumentary: Present: normal turgor





- Neurologic


Neurologic: Present: CNII-XII intact





- Musculoskeletal


Musculoskeletal: Present: gait normal





- Psychiatric


Psychiatric: Present: A&O x's 3





- Allied health notes


Allied health notes reviewed: nursing





- Labs


CBC & Chem 7: 


                                 09/23/21 09:36





                                 09/23/21 09:36


Labs: 


                  Abnormal Lab Results - Last 24 Hours (Table)











  09/23/21 Range/Units





  09:36 


 


Potassium  3.2 L  (3.5-5.1)  mmol/L


 


Glucose  117 H  (74-99)  mg/dL














Assessment and Plan


Assessment: 


TIA/CVA rule out


Severe carotid stenosis


Temporal arteritis rule out


Cephalgia history of migraines


Conary artery disease


Hypertension


Hyperlipidemia


Fibromyalgia


History of DVT


GERD/reflux


Osteoarthritis


Peripheral artery disease


History of right aortic iliac bypass


History of right carotid enterectomy


History of left knee replacement


Continue dependence


Daily alcohol use


Full code





Plan: 


TIA/CVA rule out, awaiting on MRI MRA


Severe carotid stenosis, continue Plavix, aspirin, and statin therapy, vascular 

surgery will follow-up with patient outpatient for intervention


Cephalgia, resolved


COPD without exacerbation, continue breathing treatments around the clock 

improved


Continue home medications


Continue to monitor vital signs and diagnostic testing


Further recommendations come based on patient's clinical condition


After MRI/MRA and cleared by neurology patient will be able to be discharged 

home





Time with Patient: Greater than 30

## 2021-09-23 NOTE — MR
EXAMINATION TYPE: MR brain wo/w con

 

DATE OF EXAM: 9/23/2021 3:15 PM

 

COMPARISON: NONE

 

HISTORY: Neuro deficit, headache, evaluate for TIA/CVA.

 

CONTRAST: Patient received 5 mL intravenous Gadavist gadolinium contrast.  

 

Multiplanar and multispin-echo imaging of the brain was performed .  Pre and post contrast enhanced i
mages are obtained.

 

The ventricles, basal cisterns and sulci overlying the cerebral convexities are mildly enlarged.  

 

There is evidence of mild periventricular white matter ischemic demyelination.  

 

Remote deep white matter insults are also noted.  

 

There are multiple punctate foci of increased signal on the diffusion weighted data set within the ri
ght corona radiata and centrum semioval bilaterally felt to reflect embolic insult. Correlate clinica
lly.

 

There is no evidence for midline shift or mass effect.  

 

Acute intracranial hemorrhage or extra-axial collection is not evident.   

 

No enhancing lesions are seen.  

 

The paranasal sinuses and mastoid air cells are well-aerated.

 

IMPRESSION:    

There are multiple punctate foci of increased signal on the diffusion weighted data set within the ri
ght corona radiata and centrum semioval bilaterally felt to reflect embolic insult. Correlate clinica
lly.

## 2021-09-23 NOTE — P.PN
Subjective


Progress Note Date: 09/23/21


Principal diagnosis: 





Severe right ICA stenosis





Patient is seen and examined at the bedside.  No acute changes through the 

night. States upper extremity weakness completely resolved.  Awaiting MRI.  

Carotid Doppler showed greater than 70% stenosis in the right ICA and 50-69% 

stenosis in the left ICA.





Objective





- Vital Signs


Vital signs: 


                                   Vital Signs











Temp  97.8 F   09/23/21 04:00


 


Pulse  78   09/23/21 08:34


 


Resp  16   09/23/21 08:34


 


BP  140/70   09/23/21 04:00


 


Pulse Ox  97   09/23/21 04:00








                                 Intake & Output











 09/22/21 09/23/21 09/23/21





 18:59 06:59 18:59


 


Intake Total 480  


 


Output Total 0  


 


Balance 480  


 


Weight  57 kg 


 


Intake:   


 


  Oral 480  


 


Output:   


 


  Urine 0  


 


Other:   


 


  Voiding Method  Toilet 


 


  # Voids  1 














- Exam





General appearance: The patient is alert, oriented, appears in no acute 

distress.


HET: Head is normocephalic and atraumatic. 


Neck: Supple without lymphadenopathy.  Trachea midline.


Heart: S1 S2.  Regular rate and rhythm.


Lungs: Diminished bilaterally.


Extremities: Normal skin color and turgor.  No cyanosis, rash, ulceration, 

clubbing, or edema.


Neurological: Alert and oriented 3.  No focal deficits.  Equal grasp 

bilaterally with good strength, 5/5. 





- Labs


CBC & Chem 7: 


                                 09/22/21 04:24





                                 09/22/21 04:24


Labs: 


                  Abnormal Lab Results - Last 24 Hours (Table)











  09/21/21 Range/Units





  20:50 


 


Vitamin B12  182.0 L  (200.0-944.0)  pg/mL














Assessment and Plan


Assessment: 





1.  Severe right and moderate to severe left proximal ICA stenosis per CT 

angiogram/Carotid doppler


2.  Bilateral Extremity weakness


3.  Headaches


4.  History of peripheral arterial disease status post right aorta iliac bypass


5.  History of carotid stenosis status post right carotid endarterectomy


6.  Coronary artery disease


7.  COPD


8.  Current smoker


9.  Daily alcohol use


Plan: 





1.  Carotid ultrasound ordered/reviewed


2.  Continue aspirin, Plavix, and statin


3.  Appreciate recommendations from neurology


4.  Tobacco cessation


5.  Alcohol abstinence


6.  Await MRI results


7.  Further recommendations to follow Velma however patient may be discharged 

home from a vascular surgical standpoint and can be scheduled for outpatient 

surgical intervention





Thank you for this consultation and allowing us to take part in the plan of care

of your patient during her hospital stay





The impression and plan of care has been dictated as directed.





Dr. Cade


I performed a history and examination of this patient,  discussed the same with 

the dictator.  I agree with the dictator's note ,documented as a scribe.  Any 

additional findings or plans will be noted.

## 2021-09-26 ENCOUNTER — HOSPITAL ENCOUNTER (EMERGENCY)
Dept: HOSPITAL 47 - EC | Age: 61
LOS: 1 days | Discharge: HOME | End: 2021-09-27
Payer: COMMERCIAL

## 2021-09-26 VITALS — RESPIRATION RATE: 18 BRPM

## 2021-09-26 DIAGNOSIS — Z88.1: ICD-10-CM

## 2021-09-26 DIAGNOSIS — I25.10: ICD-10-CM

## 2021-09-26 DIAGNOSIS — Z91.041: ICD-10-CM

## 2021-09-26 DIAGNOSIS — Z90.49: ICD-10-CM

## 2021-09-26 DIAGNOSIS — Z79.82: ICD-10-CM

## 2021-09-26 DIAGNOSIS — Z88.0: ICD-10-CM

## 2021-09-26 DIAGNOSIS — J45.909: ICD-10-CM

## 2021-09-26 DIAGNOSIS — I10: ICD-10-CM

## 2021-09-26 DIAGNOSIS — I63.9: Primary | ICD-10-CM

## 2021-09-26 DIAGNOSIS — Z96.652: ICD-10-CM

## 2021-09-26 DIAGNOSIS — G45.9: ICD-10-CM

## 2021-09-26 DIAGNOSIS — K21.9: ICD-10-CM

## 2021-09-26 DIAGNOSIS — M19.90: ICD-10-CM

## 2021-09-26 DIAGNOSIS — Z90.710: ICD-10-CM

## 2021-09-26 DIAGNOSIS — F17.200: ICD-10-CM

## 2021-09-26 DIAGNOSIS — E78.5: ICD-10-CM

## 2021-09-26 DIAGNOSIS — Z79.02: ICD-10-CM

## 2021-09-26 DIAGNOSIS — Z86.718: ICD-10-CM

## 2021-09-26 DIAGNOSIS — R51.9: ICD-10-CM

## 2021-09-26 LAB
ALBUMIN SERPL-MCNC: 3.9 G/DL (ref 3.5–5)
ALP SERPL-CCNC: 86 U/L (ref 38–126)
ALT SERPL-CCNC: 11 U/L (ref 4–34)
ANION GAP SERPL CALC-SCNC: 11 MMOL/L
APTT BLD: 22.7 SEC (ref 22–30)
AST SERPL-CCNC: 18 U/L (ref 14–36)
BASOPHILS # BLD AUTO: 0 K/UL (ref 0–0.2)
BASOPHILS NFR BLD AUTO: 0 %
BUN SERPL-SCNC: 16 MG/DL (ref 7–17)
CALCIUM SPEC-MCNC: 9.1 MG/DL (ref 8.4–10.2)
CHLORIDE SERPL-SCNC: 103 MMOL/L (ref 98–107)
CK SERPL-CCNC: 67 U/L (ref 30–135)
CO2 SERPL-SCNC: 21 MMOL/L (ref 22–30)
EOSINOPHIL # BLD AUTO: 0.1 K/UL (ref 0–0.7)
EOSINOPHIL NFR BLD AUTO: 2 %
ERYTHROCYTE [DISTWIDTH] IN BLOOD BY AUTOMATED COUNT: 4.07 M/UL (ref 3.8–5.4)
ERYTHROCYTE [DISTWIDTH] IN BLOOD: 12.8 % (ref 11.5–15.5)
GLUCOSE SERPL-MCNC: 128 MG/DL (ref 74–99)
HCT VFR BLD AUTO: 38.3 % (ref 34–46)
HGB BLD-MCNC: 12.9 GM/DL (ref 11.4–16)
INR PPP: 0.9 (ref ?–1.2)
LIPASE SERPL-CCNC: 54 U/L (ref 23–300)
LYMPHOCYTES # SPEC AUTO: 2.8 K/UL (ref 1–4.8)
LYMPHOCYTES NFR SPEC AUTO: 36 %
MAGNESIUM SPEC-SCNC: 1.7 MG/DL (ref 1.6–2.3)
MCH RBC QN AUTO: 31.6 PG (ref 25–35)
MCHC RBC AUTO-ENTMCNC: 33.6 G/DL (ref 31–37)
MCV RBC AUTO: 94 FL (ref 80–100)
MONOCYTES # BLD AUTO: 0.4 K/UL (ref 0–1)
MONOCYTES NFR BLD AUTO: 6 %
NEUTROPHILS # BLD AUTO: 4.2 K/UL (ref 1.3–7.7)
NEUTROPHILS NFR BLD AUTO: 54 %
PLATELET # BLD AUTO: 242 K/UL (ref 150–450)
POTASSIUM SERPL-SCNC: 3.4 MMOL/L (ref 3.5–5.1)
PROT SERPL-MCNC: 6.1 G/DL (ref 6.3–8.2)
PT BLD: 9.8 SEC (ref 9–12)
SODIUM SERPL-SCNC: 135 MMOL/L (ref 137–145)
WBC # BLD AUTO: 7.8 K/UL (ref 3.8–10.6)

## 2021-09-26 PROCEDURE — 85730 THROMBOPLASTIN TIME PARTIAL: CPT

## 2021-09-26 PROCEDURE — 83880 ASSAY OF NATRIURETIC PEPTIDE: CPT

## 2021-09-26 PROCEDURE — 85025 COMPLETE CBC W/AUTO DIFF WBC: CPT

## 2021-09-26 PROCEDURE — 96361 HYDRATE IV INFUSION ADD-ON: CPT

## 2021-09-26 PROCEDURE — 84484 ASSAY OF TROPONIN QUANT: CPT

## 2021-09-26 PROCEDURE — 84443 ASSAY THYROID STIM HORMONE: CPT

## 2021-09-26 PROCEDURE — 83605 ASSAY OF LACTIC ACID: CPT

## 2021-09-26 PROCEDURE — 70450 CT HEAD/BRAIN W/O DYE: CPT

## 2021-09-26 PROCEDURE — 83690 ASSAY OF LIPASE: CPT

## 2021-09-26 PROCEDURE — 96374 THER/PROPH/DIAG INJ IV PUSH: CPT

## 2021-09-26 PROCEDURE — 80053 COMPREHEN METABOLIC PANEL: CPT

## 2021-09-26 PROCEDURE — 80320 DRUG SCREEN QUANTALCOHOLS: CPT

## 2021-09-26 PROCEDURE — 85610 PROTHROMBIN TIME: CPT

## 2021-09-26 PROCEDURE — 83735 ASSAY OF MAGNESIUM: CPT

## 2021-09-26 PROCEDURE — 84100 ASSAY OF PHOSPHORUS: CPT

## 2021-09-26 PROCEDURE — 99285 EMERGENCY DEPT VISIT HI MDM: CPT

## 2021-09-26 PROCEDURE — 82550 ASSAY OF CK (CPK): CPT

## 2021-09-26 PROCEDURE — 93005 ELECTROCARDIOGRAM TRACING: CPT

## 2021-09-26 NOTE — ED
Recheck HPI





- General


Chief Complaint: Recheck/Abnormal Lab/Rx


Stated Complaint: Recheck


Time Seen by Provider: 21 23:00


Source: patient, family, RN notes reviewed, old records reviewed


Mode of arrival: ambulatory


Limitations: no limitations





- History of Present Illness


Initial Comments: 





This is a 61-year-old female to the emergency room today.  Patient presents 

today for evaluation of weakness and ataxia difficulty with ambulation and 

recent admission for CVA.  Patient is also having some blurred vision.  No 

headache currently.


MD Complaint: other (Persistent dizziness possibly related to recent CVA)


-: days(s)


Returns Today for: persistent/worsening pain related to initial visit


Symptoms Since Prior Visit: no new symptoms, worsening pain


Context: called for abnormal lab result





- Related Data


                                Home Medications











 Medication  Instructions  Recorded  Confirmed


 


Carvedilol 25 mg PO BID 12/11/15 09/21/21


 


Losartan Potassium 100 mg PO DAILY 12/11/15 09/21/21


 


hydroCHLOROthiazide 25 mg PO DAILY 12/11/15 09/21/21


 


oxyCODONE-APAP 5-325MG [Percocet 1 tab PO PC-LUNCH PRN 18





5-325 mg]   


 


Pravastatin Sodium [Pravachol] 40 mg PO HS 20


 


Fluticasone Nasal Spray [Flonase 2 spr EA NOSTRIL DAILY 21





Nasal Spray]   


 


Ondansetron [Zofran] 4 mg PO Q8HR PRN 21


 


diphenhydrAMINE [Benadryl] 25 mg PO DAILY PRN 21


 


hydrALAZINE HCL [Apresoline] 25 mg PO TID 21


 


Albuterol Sulfate [Albuterol 2 puff PO RT-Q6H PRN 21





Sulfate Hfa]   


 


Baclofen 10 mg PO TID PRN 21


 


Bio Complete 3 1 tab PO BID 21


 


Dicyclomine HCl 20 mg PO TID 21


 


Ipratropium Bromide [Atrovent Hfa] 2 puff INHALATION RT-QID 21


 


Ipratropium-Albuterol Nebulize 3 ml INHALATION RT-BID 21





[Duoneb 0.5 mg-3 mg/3 ml Soln]   


 


Levofloxacin [Levaquin] 500 mg PO DAILY 21


 


Morphine Sulfate ER [Ms Contin] 15 mg PO Q12HR 21


 


Phenylephrine HCl/Acetaminophn 1 tab PO BID PRN 21





[Tylenol Sinus Headache Caplet]   


 


Aspirin EC [Ecotrin Low Dose] 81 mg .ROUTE DAILY 21


 


Aspirin EC [Ecotrin Low Dose] 81 mg PO DAILY 21








                                  Previous Rx's











 Medication  Instructions  Recorded


 


Clopidogrel [Plavix] 75 mg PO DAILY #30 tab 12/17/15











                                    Allergies











Allergy/AdvReac Type Severity Reaction Status Date / Time


 


cephalexin [From Keflex] Allergy Severe Anaphylaxis Verified 21 22:45


 


aspirin Allergy  Anaphylaxis Verified 21 22:45


 


NSAIDS (Non-Steroidal Allergy  Anaphylaxis Verified 21 22:45





Anti-Inflamma     


 


Penicillins Allergy  Unknown Verified 21 22:45


 


Iodinated Contrast Media AdvReac Mild cold Verified 21 22:45





[Iodinated Contrast Media -   symptoms,  





IV Dye]   runny  





   nose,  





   itchy eyes  





   cough  














Review of Systems


ROS Statement: 


Those systems with pertinent positive or pertinent negative responses have been 

documented in the HPI.





ROS Other: All systems not noted in ROS Statement are negative.





Past Medical History


Past Medical History: Asthma, Coronary Artery Disease (CAD), Cancer, COPD, Deep 

Vein Thrombosis (DVT), Fibromyalgia, GERD/Reflux, Hyperlipidemia, Hypertension, 

Osteoarthritis (OA), Pneumonia, Respiratory Disorder, Vascular Disorder


Additional Past Medical History / Comment(s): pad,chronic bronchitis, nodules on

thyroid, leukoplakia-lip and vocal cords. past" gangrene on right foot", Ileus 

(2020)., herniated discs with back pain., states "food doesnt want to go down"


History of Any Multi-Drug Resistant Organisms: None Reported


Past Surgical History: Appendectomy,  Section, Cholecystectomy, 

Hysterectomy, Joint Replacement, Orthopedic Surgery


Additional Past Surgical History / Comment(s): leukoplakia removed from lip and 

vocal cords.cervical fusion, rt aorto iliac bypass,aortogram w/ runoff.  2015 had stenting of aorto to rt ext iliac bypass, rt foot 2nd toe 

reconstructive sx, rt carotid endarterectomy, egd/colonoscopy ,lt knee 

replacment.lt elbow sx,umbilical hernia repair,bilcarpal tunnel release,lt and 

rt breast bx-neg, partial hysterectomy


Past Anesthesia/Blood Transfusion Reactions: Previous Problems w/ Anesthesia, 

Postoperative Nausea & Vomiting (PONV)


Additional Past Anesthesia/Blood Transfusion Reaction / Comment(s): difficulty 

waking from anesthesia


Past Psychological History: No Psychological Hx Reported


Smoking Status: Current every day smoker


Past Alcohol Use History: Abuse, Daily


Past Drug Use History: None Reported





- Past Family History


  ** Mother


Family Medical History: Cancer, Coronary Artery Disease (CAD), Renal Disease


Additional Family Medical History / Comment(s): CABG, LUNG CANCER,





  ** Brother(s)


Family Medical History: Coronary Artery Disease (CAD), CVA/TIA


Additional Family Medical History / Comment(s): 1 BROTHER CABG, 1 BROTHER STROKE





  ** Father


Family Medical History: Coronary Artery Disease (CAD), Renal Disease


Additional Family Medical History / Comment(s): X4 OPEN HEART SX. HUGE CARDIAC 

HX ON DADS SIDE OF FAMILY





General Exam





- General Exam Comments


Initial Comments: 





NIH of 0


Limitations: no limitations


General appearance: alert, in no apparent distress


Head exam: Present: atraumatic, normocephalic, normal inspection


Eye exam: Present: normal appearance, PERRL, EOMI.  Absent: scleral icterus, 

conjunctival injection, periorbital swelling


ENT exam: Present: normal exam, mucous membranes moist


Neck exam: Present: normal inspection.  Absent: tenderness, meningismus, 

lymphadenopathy


Respiratory exam: Present: normal lung sounds bilaterally.  Absent: respiratory 

distress, wheezes, rales, rhonchi, stridor


Cardiovascular Exam: Present: regular rate, normal rhythm, normal heart sounds. 

Absent: systolic murmur, diastolic murmur, rubs, gallop, clicks


GI/Abdominal exam: Present: soft, normal bowel sounds.  Absent: distended, 

tenderness, guarding, rebound, rigid


Extremities exam: Present: normal inspection, full ROM, normal capillary refill.

 Absent: tenderness, pedal edema, joint swelling, calf tenderness


Back exam: Present: normal inspection


Neurological exam: Present: alert, oriented X3, CN II-XII intact


Psychiatric exam: Present: normal affect, normal mood


Skin exam: Present: warm, dry, intact, normal color.  Absent: rash





Course


                                   Vital Signs











  21





  22:45 00:29


 


Temperature 97.8 F 


 


Pulse Rate 84 81


 


Respiratory 18 18





Rate  


 


Blood Pressure 83/54 92/61


 


O2 Sat by Pulse 95 93 L





Oximetry  














Medical Decision Making





- Medical Decision Making





61 female to the emergency prior.  Patient is presenting for evaluation regards 

to TIA symptoms remain resolved currently.  Blood pressure normal patient on 

Plavix and aspirin, at this point patient has no neurological findings and can b

e discharged home





- Lab Data


Result diagrams: 


                                 21 23:21





                                 21 23:21


                                   Lab Results











  21 Range/Units





  23:21 23:21 23:21 


 


WBC  7.8    (3.8-10.6)  k/uL


 


RBC  4.07    (3.80-5.40)  m/uL


 


Hgb  12.9    (11.4-16.0)  gm/dL


 


Hct  38.3    (34.0-46.0)  %


 


MCV  94.0    (80.0-100.0)  fL


 


MCH  31.6    (25.0-35.0)  pg


 


MCHC  33.6    (31.0-37.0)  g/dL


 


RDW  12.8    (11.5-15.5)  %


 


Plt Count  242    (150-450)  k/uL


 


MPV  7.6    


 


Neutrophils %  54    %


 


Lymphocytes %  36    %


 


Monocytes %  6    %


 


Eosinophils %  2    %


 


Basophils %  0    %


 


Neutrophils #  4.2    (1.3-7.7)  k/uL


 


Lymphocytes #  2.8    (1.0-4.8)  k/uL


 


Monocytes #  0.4    (0-1.0)  k/uL


 


Eosinophils #  0.1    (0-0.7)  k/uL


 


Basophils #  0.0    (0-0.2)  k/uL


 


PT   9.8   (9.0-12.0)  sec


 


INR   0.9   (<1.2)  


 


APTT   22.7   (22.0-30.0)  sec


 


Sodium    135 L  (137-145)  mmol/L


 


Potassium    3.4 L  (3.5-5.1)  mmol/L


 


Chloride    103  ()  mmol/L


 


Carbon Dioxide    21 L  (22-30)  mmol/L


 


Anion Gap    11  mmol/L


 


BUN    16  (7-17)  mg/dL


 


Creatinine    0.95  (0.52-1.04)  mg/dL


 


Est GFR (CKD-EPI)AfAm    74  (>60 ml/min/1.73 sqM)  


 


Est GFR (CKD-EPI)NonAf    64  (>60 ml/min/1.73 sqM)  


 


Glucose    128 H  (74-99)  mg/dL


 


Plasma Lactic Acid Waldemar     (0.7-2.0)  mmol/L


 


Calcium    9.1  (8.4-10.2)  mg/dL


 


Phosphorus    5.2 H  (2.5-4.5)  mg/dL


 


Magnesium    1.7  (1.6-2.3)  mg/dL


 


Total Bilirubin    0.3  (0.2-1.3)  mg/dL


 


AST    18  (14-36)  U/L


 


ALT    11  (4-34)  U/L


 


Alkaline Phosphatase    86  ()  U/L


 


Creatine Kinase    67  ()  U/L


 


Troponin I     (0.000-0.034)  ng/mL


 


NT-Pro-B Natriuret Pep     pg/mL


 


Total Protein    6.1 L  (6.3-8.2)  g/dL


 


Albumin    3.9  (3.5-5.0)  g/dL


 


Lipase    54  ()  U/L


 


TSH    1.040  (0.465-4.680)  mIU/L


 


Serum Alcohol    <10  mg/dL














  21 Range/Units





  23:21 23:21 23:21 


 


WBC     (3.8-10.6)  k/uL


 


RBC     (3.80-5.40)  m/uL


 


Hgb     (11.4-16.0)  gm/dL


 


Hct     (34.0-46.0)  %


 


MCV     (80.0-100.0)  fL


 


MCH     (25.0-35.0)  pg


 


MCHC     (31.0-37.0)  g/dL


 


RDW     (11.5-15.5)  %


 


Plt Count     (150-450)  k/uL


 


MPV     


 


Neutrophils %     %


 


Lymphocytes %     %


 


Monocytes %     %


 


Eosinophils %     %


 


Basophils %     %


 


Neutrophils #     (1.3-7.7)  k/uL


 


Lymphocytes #     (1.0-4.8)  k/uL


 


Monocytes #     (0-1.0)  k/uL


 


Eosinophils #     (0-0.7)  k/uL


 


Basophils #     (0-0.2)  k/uL


 


PT     (9.0-12.0)  sec


 


INR     (<1.2)  


 


APTT     (22.0-30.0)  sec


 


Sodium     (137-145)  mmol/L


 


Potassium     (3.5-5.1)  mmol/L


 


Chloride     ()  mmol/L


 


Carbon Dioxide     (22-30)  mmol/L


 


Anion Gap     mmol/L


 


BUN     (7-17)  mg/dL


 


Creatinine     (0.52-1.04)  mg/dL


 


Est GFR (CKD-EPI)AfAm     (>60 ml/min/1.73 sqM)  


 


Est GFR (CKD-EPI)NonAf     (>60 ml/min/1.73 sqM)  


 


Glucose     (74-99)  mg/dL


 


Plasma Lactic Acid Waldemar  1.5    (0.7-2.0)  mmol/L


 


Calcium     (8.4-10.2)  mg/dL


 


Phosphorus     (2.5-4.5)  mg/dL


 


Magnesium     (1.6-2.3)  mg/dL


 


Total Bilirubin     (0.2-1.3)  mg/dL


 


AST     (14-36)  U/L


 


ALT     (4-34)  U/L


 


Alkaline Phosphatase     ()  U/L


 


Creatine Kinase     ()  U/L


 


Troponin I   <0.012   (0.000-0.034)  ng/mL


 


NT-Pro-B Natriuret Pep    82  pg/mL


 


Total Protein     (6.3-8.2)  g/dL


 


Albumin     (3.5-5.0)  g/dL


 


Lipase     ()  U/L


 


TSH     (0.465-4.680)  mIU/L


 


Serum Alcohol     mg/dL














- EKG Data


-: EKG Interpreted by Me (EKG shows sinus rhythm 77.   QRS 74 QTc 420)





- Radiology Data


Radiology results: report reviewed (CT brain is negative for acute disease), 

image reviewed





Disposition


Clinical Impression: 


 Cerebrovascular accident (CVA), TIA (transient ischemic attack), Headache





Disposition: HOME SELF-CARE


Condition: Undetermined


Instructions (If sedation given, give patient instructions):  Transient Ischemic

Attack (ED), Self Care Measures After a Stroke (ED)


Is patient prescribed a controlled substance at d/c from ED?: No


Referrals: 


Parmjit Roe MD [Primary Care Provider] - 1-2 days

## 2021-09-27 VITALS — DIASTOLIC BLOOD PRESSURE: 57 MMHG | HEART RATE: 76 BPM | SYSTOLIC BLOOD PRESSURE: 103 MMHG | TEMPERATURE: 98 F

## 2021-09-27 NOTE — CT
EXAMINATION TYPE: CT brain wo con

 

DATE OF EXAM: 9/26/2021

 

COMPARISON: 9/21/2021

 

HISTORY: blurred vison.

 

CT DLP: 1188.4 mGycm

Automated exposure control for dose reduction was used.

 

Ventricles have normal size. There is no mass effect nor midline shift. There is no sign of intracran
ial hemorrhage. Calvarium is intact.

 

IMPRESSION:

Normal unenhanced head CT scan. No change.

## 2022-02-04 ENCOUNTER — HOSPITAL ENCOUNTER (OUTPATIENT)
Dept: HOSPITAL 47 - RADCTMAIN | Age: 62
Discharge: HOME | End: 2022-02-04
Attending: INTERNAL MEDICINE
Payer: COMMERCIAL

## 2022-02-04 DIAGNOSIS — Z90.49: ICD-10-CM

## 2022-02-04 DIAGNOSIS — K43.2: ICD-10-CM

## 2022-02-04 DIAGNOSIS — N83.202: ICD-10-CM

## 2022-02-04 DIAGNOSIS — K83.8: ICD-10-CM

## 2022-02-04 DIAGNOSIS — K46.9: ICD-10-CM

## 2022-02-04 DIAGNOSIS — I74.09: Primary | ICD-10-CM

## 2022-02-04 DIAGNOSIS — I70.211: ICD-10-CM

## 2022-02-04 PROCEDURE — 75635 CT ANGIO ABDOMINAL ARTERIES: CPT

## 2022-02-04 NOTE — CT
EXAMINATION TYPE: CT angio abd aorta w/Runoff

 

DATE OF EXAM: 2/4/2022

 

COMPARISON: 9/2/2020

 

HISTORY: 62-year-old female K55.1, chronic vascular disorder.

 

TECHNIQUE: Contiguous axial scanning of the abdomen and pelvis with bilateral lower extremity runoff 
performed without and with IV Contrast, patient injected with 125 mL of Isovue 370. Coronal/sagittal 
MIP reconstructions performed. 3-D reconstructions generated on a dedicated independent workstation.

 

CT DLP: 3009 mGycm

Automated exposure control for dose reduction was used.

 

FINDINGS: 

Heart normal size without pericardial effusion. LAD coronary artery calcifications are present. Mild 
emphysematous change in the lower lungs.

 

Dilated bile duct at 9 mm with interbody biliary ductal dilatation likely chronic postcholecystectomy
 status. Otherwise, no focal liver lesion. Portal venous system is patent.

 

Cholecystectomy clips.

 

Adrenal glands, kidneys, spleen, and pancreas within normal limits.

 

Supraumbilical midline incisional fatty hernias are present measuring up to 3.7 cm wide and abdominal
 wall defects measuring up to 8 mm wide. There is a herniation of omental/mesenteric vessels on axial
 image 30.

 

No dilated small bowel, free fluid, or free air.

 

Some prominent left periaortic lymph nodes in the retroperitoneum measuring up to 1.1 cm are unchange
d, probably reactive/post inflammatory. No mesenteric lymphadenopathy.

 

No significant stool burden. Redundant sigmoid colon. No pericolonic inflammatory change.

 

Bladder is nondistended. There is some pelvic floor relaxation. Uterus surgically absent.

 

A 1.8 cm cyst of the left ovary measured 1.4 cm on 9/2/2020. Annual ultrasound surveillance is recomm
ended in a postmenopausal female. Small right ovary. No abnormal fluid collection in the pelvis or pe
lvic lymphadenopathy.

 

BONES:

Some nonspecific focal subchondral sclerosis along the superior weightbearing aspects of the left gre
ater than right femoral heads. Correlate for any risk factors for early AVN.

 

Degenerated convex scoliosis of the lumbar spine.

 

Degenerative changes at the right knee. A 3.0 cm Baker cyst on the left. Suspect an intraosseous gang
lion within the fibular head secondary to degenerative change at the proximal tibiofibular joint.

 

Changes of left total knee arthroplasty.

 

 

 

VASCULATURE:

Mild to moderate atherosclerotic calcifications visualized lower thoracic aorta and upper to mid abdo
taylor aorta. Mild atherosclerotic narrowing at the origin of the celiac axis and SMA

 

There appears to be duplex left renal arteries.

 

Redemonstrated aortobiiliac bypass graft at the level of an occluded distal abdominal aorta. There is
 a right common iliac artery stent which is patent. The external iliac arteries are also patent.

 

RIGHT:

Mild atherosclerotic plaque and calcification within the common femoral artery. Profunda femoral lizandro
ry is patent.

Superficial femoral and popliteal arteries are patent.

 

Mild to moderate atherosclerotic changes in the tibial peroneal trunk and mild within the upper anter
ior tibial artery.

There is three-vessel runoff into the foot.

 

LEFT:

Mild atherosclerotic plaque and calcifications, or femoral artery. Profunda femoral arteries patent.

Mild atherosclerotic changes upper SFA and also at the level of the adductor hiatus.

Mild atelectatic calcifications upper popliteal artery. There is a segment where the popliteal artery
 is obscured at the level of the patient's total knee arthroplasty.

Moderate focal atherosclerotic narrowing upper anterior tibial artery. Mild to moderate atherosclerot
ic narrowing tibioperoneal trunk and proximal posterior tibial artery.

The peroneal artery becomes diminutive at the distal third leg level.

The anterior tibial artery and posterior tibial artery both show faint runoff into the foot.

 

 

 

 

IMPRESSION: 

 

1. REDEMONSTRATED OCCLUSION OF THE DISTAL ABDOMINAL AORTA WITH PATENT AORTO BIILIAC BYPASS GRAFT.

 

2. MILD TO MODERATE ATHEROSCLEROTIC CHANGE WITHIN THE VESSELS BELOW THE KNEE. ON THE RIGHT, THERE IS 
THREE-VESSEL RUNOFF INTO THE FOOT. ON THE LEFT, THE PERONEAL ARTERY BECOMES DIMINUTIVE AT THE DISTAL 
THIRD LEAD LEVEL WITH THERE IS FAINT TWO-VESSEL RUNOFF INTO THE FOOT.

 

3. SUPRAUMBILICAL MIDLINE INCISIONAL HERNIAS CONTAINING FAT. ABDOMINAL WALL DEFECTS MEASURE UP TO 8 M
M WIDE AND FATTY HERNIAS MEASURING UP TO 3.7 CM WIDE. THERE IS HERNIATION OF OMENTAL/MESENTERIC VESSE
L SEEN ON AXIAL IMAGE 30.

 

4. INTRAHEPATIC AND EXTRAHEPATIC BILIARY DUCTAL DILATATION APPEARS RELATIVELY SIMILAR TO THE PRIOR EX
AM AND MAY BE CHRONIC STATUS POST CHOLECYSTECTOMY. CORRELATE WITH ALKALINE PHOSPHATASE AND BILIRUBIN 
LEVELS.

 

5. A 1.7 CM CYST OF THE LEFT OVARY MEASURED 1.4 CM ON 9/2/2020. ANNUAL ULTRASOUND SURVEILLANCE RECOMM
ENDED.

## 2022-07-22 ENCOUNTER — HOSPITAL ENCOUNTER (OUTPATIENT)
Dept: HOSPITAL 47 - RADXRMAIN | Age: 62
Discharge: HOME | End: 2022-07-22
Payer: COMMERCIAL

## 2022-07-22 DIAGNOSIS — J44.1: Primary | ICD-10-CM

## 2022-07-22 PROCEDURE — 71046 X-RAY EXAM CHEST 2 VIEWS: CPT

## 2022-07-22 NOTE — XR
EXAMINATION TYPE: XR chest 2V

 

DATE OF EXAM: 7/22/2022 12:21 PM

 

COMPARISON: Chest radiographs from 9/21/2021.

 

TECHNIQUE: XR chest 2V Frontal and lateral views of the chest.

 

CLINICAL INDICATION:Female, 62 years old with history of J44.1 CHRONIC OBSTRUCTIVE PULMONARY DISEASE;
 

 

FINDINGS: 

Lungs/Pleura: There is no evidence of pleural effusion, focal consolidation, or pneumothorax. Chronic
 parenchymal changes redemonstrated.

Heart/mediastinum: Cardiomediastinal silhouette is unremarkable.  Atherosclerotic calcifications are 
seen in the aorta. 

Musculoskeletal: No acute osseous pathology. Cervical fusion hardware redemonstrated. Cholecystectomy
 clips redemonstrated.

 

IMPRESSION: 

Chronic changes without acute pulmonary process. No significant change from prior.

## 2025-06-30 ENCOUNTER — HOSPITAL ENCOUNTER (OUTPATIENT)
Dept: HOSPITAL 47 - RADMAMWWP | Age: 65
Discharge: HOME | End: 2025-06-30
Attending: FAMILY MEDICINE
Payer: MEDICARE

## 2025-06-30 DIAGNOSIS — Z78.0: ICD-10-CM

## 2025-06-30 DIAGNOSIS — Z92.0: ICD-10-CM

## 2025-06-30 DIAGNOSIS — R92.333: Primary | ICD-10-CM

## 2025-06-30 DIAGNOSIS — N60.12: ICD-10-CM

## 2025-06-30 PROCEDURE — 76642 ULTRASOUND BREAST LIMITED: CPT

## 2025-06-30 PROCEDURE — 77062 BREAST TOMOSYNTHESIS BI: CPT

## 2025-06-30 PROCEDURE — 77066 DX MAMMO INCL CAD BI: CPT

## 2025-07-03 ENCOUNTER — HOSPITAL ENCOUNTER (OUTPATIENT)
Dept: HOSPITAL 47 - RADUSWWP | Age: 65
Discharge: HOME | End: 2025-07-03
Attending: FAMILY MEDICINE
Payer: MEDICARE

## 2025-07-03 DIAGNOSIS — Z90.49: ICD-10-CM

## 2025-07-03 DIAGNOSIS — R79.89: ICD-10-CM

## 2025-07-03 DIAGNOSIS — R14.0: Primary | ICD-10-CM

## 2025-07-03 DIAGNOSIS — K83.8: ICD-10-CM

## 2025-07-03 DIAGNOSIS — E04.2: ICD-10-CM

## 2025-07-03 DIAGNOSIS — R16.0: ICD-10-CM

## 2025-07-03 PROCEDURE — 76700 US EXAM ABDOM COMPLETE: CPT

## 2025-07-03 PROCEDURE — 76536 US EXAM OF HEAD AND NECK: CPT
